# Patient Record
Sex: FEMALE | Race: WHITE
[De-identification: names, ages, dates, MRNs, and addresses within clinical notes are randomized per-mention and may not be internally consistent; named-entity substitution may affect disease eponyms.]

---

## 2020-10-31 ENCOUNTER — HOSPITAL ENCOUNTER (INPATIENT)
Dept: HOSPITAL 72 - EMR | Age: 70
LOS: 4 days | Discharge: HOME | DRG: 563 | End: 2020-11-04
Payer: MEDICARE

## 2020-10-31 VITALS — SYSTOLIC BLOOD PRESSURE: 132 MMHG | DIASTOLIC BLOOD PRESSURE: 71 MMHG

## 2020-10-31 VITALS
BODY MASS INDEX: 38.46 KG/M2 | WEIGHT: 209 LBS | DIASTOLIC BLOOD PRESSURE: 83 MMHG | SYSTOLIC BLOOD PRESSURE: 157 MMHG | HEIGHT: 62 IN

## 2020-10-31 VITALS — DIASTOLIC BLOOD PRESSURE: 70 MMHG | SYSTOLIC BLOOD PRESSURE: 130 MMHG

## 2020-10-31 VITALS — SYSTOLIC BLOOD PRESSURE: 135 MMHG | DIASTOLIC BLOOD PRESSURE: 77 MMHG

## 2020-10-31 VITALS — DIASTOLIC BLOOD PRESSURE: 79 MMHG | SYSTOLIC BLOOD PRESSURE: 151 MMHG

## 2020-10-31 VITALS — DIASTOLIC BLOOD PRESSURE: 67 MMHG | SYSTOLIC BLOOD PRESSURE: 141 MMHG

## 2020-10-31 DIAGNOSIS — Z88.6: ICD-10-CM

## 2020-10-31 DIAGNOSIS — N39.0: ICD-10-CM
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
## 2020-10-31 DIAGNOSIS — M85.89: ICD-10-CM

## 2020-10-31 DIAGNOSIS — R33.9: ICD-10-CM

## 2020-10-31 DIAGNOSIS — Z79.4: ICD-10-CM

## 2020-10-31 DIAGNOSIS — E66.9: ICD-10-CM
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
## 2020-10-31 DIAGNOSIS — N28.9: ICD-10-CM

## 2020-10-31 DIAGNOSIS — Z85.3: ICD-10-CM

## 2020-10-31 DIAGNOSIS — D63.8: ICD-10-CM

## 2020-10-31 DIAGNOSIS — S83.91XA: Primary | ICD-10-CM

## 2020-10-31 DIAGNOSIS — S93.401A: ICD-10-CM

## 2020-10-31 DIAGNOSIS — R26.2: ICD-10-CM

## 2020-10-31 DIAGNOSIS — E11.9: ICD-10-CM

## 2020-10-31 LAB
ADD MANUAL DIFF: NO
ALBUMIN SERPL-MCNC: 4 G/DL (ref 3.4–5)
ALBUMIN/GLOB SERPL: 1.1 {RATIO} (ref 1–2.7)
ALP SERPL-CCNC: 96 U/L (ref 46–116)
ALT SERPL-CCNC: 19 U/L (ref 12–78)
APPEARANCE UR: (no result)
APTT BLD: 26 SEC (ref 23–33)
APTT PPP: (no result) S
AST SERPL-CCNC: 20 U/L (ref 15–37)
BASOPHILS NFR BLD AUTO: 0.6 % (ref 0–2)
BILIRUB SERPL-MCNC: 0.4 MG/DL (ref 0.2–1)
BUN SERPL-MCNC: 30 MG/DL (ref 7–18)
CALCIUM SERPL-MCNC: 9.5 MG/DL (ref 8.5–10.1)
CHLORIDE SERPL-SCNC: 104 MMOL/L (ref 98–107)
CK SERPL-CCNC: 173 U/L (ref 26–308)
CO2 SERPL-SCNC: 25 MMOL/L (ref 21–32)
CREAT SERPL-MCNC: 1.4 MG/DL (ref 0.55–1.3)
EOSINOPHIL NFR BLD AUTO: 1.1 % (ref 0–3)
ERYTHROCYTE [DISTWIDTH] IN BLOOD BY AUTOMATED COUNT: 14.9 % (ref 11.6–14.8)
GLOBULIN SER-MCNC: 3.6 G/DL
GLUCOSE UR STRIP-MCNC: NEGATIVE MG/DL
HCT VFR BLD CALC: 40.3 % (ref 37–47)
HGB BLD-MCNC: 13.1 G/DL (ref 12–16)
INR PPP: 1 (ref 0.9–1.1)
KETONES UR QL STRIP: (no result)
LEUKOCYTE ESTERASE UR QL STRIP: (no result)
LYMPHOCYTES NFR BLD AUTO: 14.1 % (ref 20–45)
MCV RBC AUTO: 86 FL (ref 80–99)
MONOCYTES NFR BLD AUTO: 6.1 % (ref 1–10)
NEUTROPHILS NFR BLD AUTO: 78.2 % (ref 45–75)
NITRITE UR QL STRIP: NEGATIVE
PH UR STRIP: 5 [PH] (ref 4.5–8)
PLATELET # BLD: 253 K/UL (ref 150–450)
POTASSIUM SERPL-SCNC: 3.6 MMOL/L (ref 3.5–5.1)
PROT UR QL STRIP: (no result)
RBC # BLD AUTO: 4.72 M/UL (ref 4.2–5.4)
SODIUM SERPL-SCNC: 141 MMOL/L (ref 136–145)
SP GR UR STRIP: 1.02 (ref 1–1.03)
UROBILINOGEN UR-MCNC: NORMAL MG/DL (ref 0–1)
WBC # BLD AUTO: 8.8 K/UL (ref 4.8–10.8)

## 2020-10-31 PROCEDURE — 81003 URINALYSIS AUTO W/O SCOPE: CPT

## 2020-10-31 PROCEDURE — 80048 BASIC METABOLIC PNL TOTAL CA: CPT

## 2020-10-31 PROCEDURE — 99285 EMERGENCY DEPT VISIT HI MDM: CPT

## 2020-10-31 PROCEDURE — 83550 IRON BINDING TEST: CPT

## 2020-10-31 PROCEDURE — 84484 ASSAY OF TROPONIN QUANT: CPT

## 2020-10-31 PROCEDURE — 36415 COLL VENOUS BLD VENIPUNCTURE: CPT

## 2020-10-31 PROCEDURE — 85610 PROTHROMBIN TIME: CPT

## 2020-10-31 PROCEDURE — 83540 ASSAY OF IRON: CPT

## 2020-10-31 PROCEDURE — 71045 X-RAY EXAM CHEST 1 VIEW: CPT

## 2020-10-31 PROCEDURE — 85730 THROMBOPLASTIN TIME PARTIAL: CPT

## 2020-10-31 PROCEDURE — 93005 ELECTROCARDIOGRAM TRACING: CPT

## 2020-10-31 PROCEDURE — 82728 ASSAY OF FERRITIN: CPT

## 2020-10-31 PROCEDURE — 85651 RBC SED RATE NONAUTOMATED: CPT

## 2020-10-31 PROCEDURE — 82550 ASSAY OF CK (CPK): CPT

## 2020-10-31 PROCEDURE — 84550 ASSAY OF BLOOD/URIC ACID: CPT

## 2020-10-31 PROCEDURE — 87086 URINE CULTURE/COLONY COUNT: CPT

## 2020-10-31 PROCEDURE — 85025 COMPLETE CBC W/AUTO DIFF WBC: CPT

## 2020-10-31 PROCEDURE — 83880 ASSAY OF NATRIURETIC PEPTIDE: CPT

## 2020-10-31 PROCEDURE — 82962 GLUCOSE BLOOD TEST: CPT

## 2020-10-31 PROCEDURE — 80053 COMPREHEN METABOLIC PANEL: CPT

## 2020-10-31 PROCEDURE — 87040 BLOOD CULTURE FOR BACTERIA: CPT

## 2020-10-31 PROCEDURE — 86140 C-REACTIVE PROTEIN: CPT

## 2020-10-31 RX ADMIN — INSULIN ASPART SCH UNITS: 100 INJECTION, SOLUTION INTRAVENOUS; SUBCUTANEOUS at 16:30

## 2020-10-31 RX ADMIN — HEPARIN SODIUM SCH UNITS: 5000 INJECTION INTRAVENOUS; SUBCUTANEOUS at 20:11

## 2020-10-31 RX ADMIN — INSULIN ASPART SCH UNITS: 100 INJECTION, SOLUTION INTRAVENOUS; SUBCUTANEOUS at 20:23

## 2020-10-31 RX ADMIN — INSULIN DETEMIR SCH UNITS: 100 INJECTION, SOLUTION SUBCUTANEOUS at 20:23

## 2020-10-31 RX ADMIN — ATORVASTATIN CALCIUM SCH MG: 20 TABLET, FILM COATED ORAL at 20:09

## 2020-10-31 RX ADMIN — OXYCODONE HYDROCHLORIDE AND ACETAMINOPHEN PRN TAB: 5; 325 TABLET ORAL at 23:16

## 2020-10-31 NOTE — NUR
ED Nurse Note: pt BIBA  from home c/o right leg pain 8/10 onset today 
0400. Pt slipped and fell in the bathroom. Pt reports she had prior tib/fib 
fracture in 2017. Pt is able to moves toes, denies tinlging, numbness. Pt 
denies impact to head, denies loss of conciousness.

## 2020-10-31 NOTE — NUR
NURSE NOTES:

received pt and report from DRE Swenson. pt is alert and oriented x 4 with no s/s of acute 
distress. pt with co pain. will contact dr regarding different options for pain control bec 
pt has allergy to norco. IV site clean dry and intact and running fluids as ordered. plan of 
care discussed.

## 2020-10-31 NOTE — NUR
ED Nurse Note: Report received from DRE Brown. Pt lying comfortably in bed with 
no signs of distress.

## 2020-10-31 NOTE — NUR
NURSE NOTES:

patient with allergy to tylenol and hydrocodone. patient was given morphine but pt did not 
like the feeling after taking the morphine. pt tolerated the tramadol but was asking for 
higher dose. dilaudid prescribed but note from pharmacy not to give as pt has allergy to 
norco. Dr. Peña notified, still awaiting orders. Dr. Haque ordered to change interval of 
tramadol. Dr. Rivera ordered percocet but I notified him about the pts allergies. will 
continue to follow up on pain management for the patient.

## 2020-10-31 NOTE — NUR
NURSE NOTES:

clarified allergies with pt. pt states no allergy to tylenol, that she takes tylenol at 
home, but has allergy to claritin.

## 2020-10-31 NOTE — HISTORY AND PHYSICAL REPORT
DATE OF ADMISSION:  10/31/2020

TIME SEEN:  At 10 a.m.



CONSULTANTS:

1. Flo Rivera MD.

2. Brandon Limon MD.



CHIEF COMPLAINT:  Fall, right leg pain, weakness, failure to

thrive.



BRIEF HISTORY:  This is a 70-year-old female, who lives at home by herself.

Apparently, was in the restroom, she slipped and then fell on her right

leg, was weak, could not get up and was brought to [place], diagnosed with

the above, and being admitted to medical floor.  Currently, calm, in the

ER [facility], no complaint otherwise.



REVIEW OF SYSTEMS:  No chest pain.  No shortness of breath.  No nausea,

vomiting, or diarrhea.



PAST MEDICAL HISTORY:  Includes diabetes, breast cancer.



PAST SURGICAL HISTORY:  Hysterectomy and lumpectomy.



MEDICATIONS:  Include ibuprofen, tramadol, _____.



ALLERGIES:  Norco.



SOCIAL HISTORY:  No smoking.  No alcohol.  No intravenous drug

abuse.



FAMILY HISTORY:  Noncontributory.



PHYSICAL EXAMINATION:

GENERAL:  Calm, in the ER [place], oriented x3, slight distress secondary to

pain.

VITAL SIGNS:  Temperature is 97 degrees, pulse _____, respirations 20,

blood pressure 151/79.

CARDIOVASCULAR:  No murmur.

LUNGS:  Distant and clear.

ABDOMEN:  Bowel sounds positive.  Nontender, nondistended.

EXTREMITIES:  No cyanosis or edema.

NEUROLOGIC:  The patient moves all extremities, slightly weak.



LABORATORY DATA:  Labs at this time CBC is normal.  BMP shows BUN and

creatinine is 30/1.4.  INR is 1.0.  Urinalysis is 3+ protein, 2+ leukocyte

esterase.



ASSESSMENT:  Right leg pain, weak, status post fall, failure to thrive,

urinary retention, UTI, diabetes, breast cancer.



PLAN:

1. PT, OT, dietary followup.  Pain control.

2. We will add ID and follow Dr. Haywood, and antibiotics per Infectious

Disease.

3. We will continue blood pressure, blood sugar control.

4. We will continue to follow the patient.

5. CBC and BMP in the morning.









  ______________________________________________

  Dominguez Haque D.O.





DR:  CYNDI

D:  10/31/2020 10:15

T:  10/31/2020 23:02

JOB#:  [id number]

CC:

## 2020-10-31 NOTE — DIAGNOSTIC IMAGING REPORT
EXAM:

  XR Right Foot Complete, 3 or More Views

 

CLINICAL HISTORY:

  TRAUMA

 

TECHNIQUE:

  Frontal, lateral and oblique views of the right foot.

 

COMPARISON:

  None

 

FINDINGS:

  Bones/joints: No displaced fracture or dislocation identified.  

Osteopenia.  Degenerative changes of the right first MTP joint and 

interphalangeal joints.

 

  Soft tissues: Normal.

 

  Other: Vascular calcifications.

 

IMPRESSION:   

  No displaced fracture or dislocation identified.

## 2020-10-31 NOTE — NUR
-------------------------------------------------------------------------------

           *** Note undone in EDM - 10/31/20 at 0940 by HAROLDO ***            

-------------------------------------------------------------------------------

ED Nurse Note: Report given to DRE Swenson on 3E

## 2020-10-31 NOTE — NUR
NURSE NOTES:

Received pt from ER and report from DRE Zarate. Pt transferred by ER tech via gurney. 
Checked all belongings with pt. Pt alert and oriented, able to make needs known. RA, no 
respiratory distress noted. Pt c/o pain on right foot 6/10. Vital signs stable. Skin intact. 
No redness or swelling noted.  [place] pt to room. Educated pt to use for assistance. pt 
verbalized understanding. Side rails upx2. Bed in low position, locked. Call light and needs 
within reach. Will follow up with Dr. Haque for admission orders and pain.

## 2020-10-31 NOTE — NUR
NURSE NOTES:

Pt stated that morphine gave her sudden headache and weird feeling after morphine was give 
for pain. MD aware. Received new order. order carried out.

## 2020-10-31 NOTE — EMERGENCY ROOM REPORT
History of Present Illness


General


Chief Complaint:  Pain


Source:  Patient





Present Illness


HPI


The patient recently moved into an apartment.  She was trying to urinate 

hovering over the toilet and slipped.  She Laurent she twisted her ankle or fell

on her leg twisting it she is unable to walk because of pain.  She fractured her

tibia in 2019.  She was in rehab after that and healed completely.  She denies 

hitting her head or loss of consciousness.  There is no hip or back pain.  She 

denies numbness.  This happened just before calling 911 and being transported by

[facility].  She reports the pain 9/10 at this time, aching.  It is better when she is 

not trying to weight-bear or ambulate -which she cannot do.





The patient is a type II diabetic but this is taking insulin.  She denies 

polyuria or polydipsia.





No fevers, chills, sore throat, chest pain, palpitations, nausea, vomiting, 

diarrhea, dysuria, abdominal pain, shortness of breath, rashes, depression, 

anxiety, visual changes, dizziness, headache.


Allergies:  


Coded Allergies:  


     ACETAMINOPHEN (Verified  Allergy, Unknown, 10/31/20)


     HYDROCODONE (Verified  Allergy, Unknown, 10/31/20)





COVID-19 Screening


Contact w/high risk pt:  No


Experienced COVID-19 symptoms?:  No


COVID-19 Testing performed PTA:  No





Patient History


Past Medical History:  see triage record, DM


Past Surgical History:  hysterectomy, other - tibia fracture 2019, L breast 

lumpectomy


Social History:  Denies: smoking, alcohol use, drug use


Social History Narrative


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


Last Menstrual Period:  UNK


Pregnant Now:  No


Reviewed Nursing Documentation:  PMH: Agreed; PSxH: Agreed





Nursing Documentation-PMH


Past Medical History:  No History, Except For


Hx Diabetes:  Yes - DM II


Hx Cancer:  Yes - L SIDED BREAST CANCER LUMPECTOMY





Review of Systems


All Other Systems:  negative except mentioned in HPI





Physical Exam





Vital Signs








  Date Time  Temp Pulse Resp B/P (MAP) Pulse Ox O2 Delivery O2 Flow Rate FiO2


 


10/31/20 05:36 98.1 103 20 157/83 (107) 99 Room Air  








Sp02 EP Interpretation:  reviewed, normal


General Appearance:  well appearing, no apparent distress, GCS 15


Head:  normocephalic, atraumatic


Eyes:  bilateral eye normal inspection, bilateral eye PERRL, bilateral eye EOMI


ENT:  moist mucus membranes


Neck:  normal inspection, full range of motion, supple, no bony tend


Respiratory:  chest non-tender, lungs clear, normal breath sounds


Cardiovascular #1:  regular rate, rhythm


Cardiovascular #2:  2+ radial (R), 2+ dorsalis pedis (R)


Gastrointestinal:  normal inspection, normal bowel sounds, non tender, soft


Genitourinary:  no CVA tenderness


Musculoskeletal:  normal range of motion, no calf tenderness, tenderness - Ankle

area no point or crepitance


Neurologic:  alert, distal neuro normal, grossly normal


Psychiatric:  judgement/insight normal, mood/affect normal


Skin:  normal color, no rash, warm/dry





Medical Decision Making


Diagnostic Impression:  


   Primary Impression:  


   Fall


   Qualified Codes:  W19.XXXA - Unspecified fall, initial encounter


   Additional Impressions:  


   Unable to ambulate


   Sprain of right lower leg


   Qualified Codes:  S83.91XA - Sprain of unspecified site of right knee, 

   initial encounter


   Diabetes


   Qualified Codes:  E11.9 - Type 2 diabetes mellitus without complications; 

   Z79.4 - Long term (current) use of insulin


   Renal insufficiency


ER Course


Patient presents after fall with right lower leg pain.  Differential includes 

fracture, sprain or contusion.  There is no evidence of DVT at this time.  There

was no loss of consciousness.  X-rays are indicated and the patient states she 

can tolerate tramadol.  No laboratory is indicated at this time.  An Accu-Chek 

will be obtained.





X-rays show osteopenia and old injury.  Accucheck 89.





Patient unable to ambulate with crutches.  Pain is uncontrolled.  Admit.  Social

services consult ordered.





EKG sinus rhythm incomplete right bundle branch block left anterior fascicular 

block LVH.  CBC normal.  CMP with renal insufficiency.  UA clear.





Discussed with Dr. Haque.





Laboratory Tests








Test


 10/31/20


06:13 10/31/20


08:55 10/31/20


09:35 10/31/20


16:55


 


POC Whole Blood Glucose


 89 MG/DL


() 


 


 Pending  





 


White Blood Count


 


 8.8 K/UL


(4.8-10.8) 


 





 


Red Blood Count


 


 4.72 M/UL


(4.20-5.40) 


 





 


Hemoglobin


 


 13.1 G/DL


(12.0-16.0) 


 





 


Hematocrit


 


 40.3 %


(37.0-47.0) 


 





 


Mean Corpuscular Volume  86 FL (80-99)    


 


Mean Corpuscular Hemoglobin


 


 27.7 PG


(27.0-31.0) 


 





 


Mean Corpuscular Hemoglobin


Concent 


 32.4 G/DL


(32.0-36.0) 


 





 


Red Cell Distribution Width


 


 14.9 %


(11.6-14.8)  H 


 





 


Platelet Count


 


 253 K/UL


(150-450) 


 





 


Mean Platelet Volume


 


 7.1 FL


(6.5-10.1) 


 





 


Neutrophils (%) (Auto)


 


 78.2 %


(45.0-75.0)  H 


 





 


Lymphocytes (%) (Auto)


 


 14.1 %


(20.0-45.0)  L 


 





 


Monocytes (%) (Auto)


 


 6.1 %


(1.0-10.0) 


 





 


Eosinophils (%) (Auto)


 


 1.1 %


(0.0-3.0) 


 





 


Basophils (%) (Auto)


 


 0.6 %


(0.0-2.0) 


 





 


Prothrombin Time


 


 11.0 SEC


(9.30-11.50) 


 





 


Prothrombin Time INR  1.0 (0.9-1.1)    


 


Activated Partial


Thromboplast Time 


 26 SEC (23-33)


 


 





 


Sodium Level


 


 141 MMOL/L


(136-145) 


 





 


Potassium Level


 


 3.6 MMOL/L


(3.5-5.1) 


 





 


Chloride Level


 


 104 MMOL/L


() 


 





 


Carbon Dioxide Level


 


 25 MMOL/L


(21-32) 


 





 


Blood Urea Nitrogen


 


 30 mg/dL


(7-18)  H 


 





 


Creatinine


 


 1.4 MG/DL


(0.55-1.30)  H 


 





 


Estimated Glomerular


Filtration Rate 


 37.2 mL/min


(>60) 


 





 


Glucose Level


 


 97 MG/DL


() 


 





 


Uric Acid


 


 5.1 MG/DL


(2.6-7.2) 


 





 


Calcium Level


 


 9.5 MG/DL


(8.5-10.1) 


 





 


Total Bilirubin


 


 0.4 MG/DL


(0.2-1.0) 


 





 


Aspartate Amino Transferase


(AST) 


 20 U/L (15-37)


 


 





 


Alanine Aminotransferase (ALT)


 


 19 U/L (12-78)


 


 





 


Alkaline Phosphatase


 


 96 U/L


() 


 





 


Total Creatine Kinase


 


 173 U/L


() 


 





 


Troponin I


 


 0.000 ng/mL


(0.000-0.056) 


 





 


C-Reactive Protein,


Quantitative 


 0.8 mg/dL


(0.00-0.90) 


 





 


Pro-B-Type Natriuretic Peptide


 


 99 pg/mL


(0-125) 


 





 


Total Protein


 


 7.6 G/DL


(6.4-8.2) 


 





 


Albumin


 


 4.0 G/DL


(3.4-5.0) 


 





 


Globulin  3.6 g/dL    


 


Albumin/Globulin Ratio  1.1 (1.0-2.7)    


 


Urine Color   Pale yellow   


 


Urine Appearance


 


 


 Slightly


cloudy 





 


Urine pH   5 (4.5-8.0)   


 


Urine Specific Gravity


 


 


 1.025


(1.005-1.035) 





 


Urine Protein


 


 


 3+ (NEGATIVE)


H 





 


Urine Glucose (UA)


 


 


 Negative


(NEGATIVE) 





 


Urine Ketones


 


 


 1+ (NEGATIVE)


H 





 


Urine Blood


 


 


 1+ (NEGATIVE)


H 





 


Urine Nitrite


 


 


 Negative


(NEGATIVE) 





 


Urine Bilirubin


 


 


 Negative


(NEGATIVE) 





 


Urine Urobilinogen


 


 


 Normal MG/DL


(0.0-1.0) 





 


Urine Leukocyte Esterase


 


 


 2+ (NEGATIVE)


H 





 


Urine RBC


 


 


 0-2 /HPF (0 -


2) 





 


Urine WBC


 


 


 2-4 /HPF (0 -


2) 





 


Urine Squamous Epithelial


Cells 


 


 Moderate /LPF


(NONE/OCC)  H 





 


Urine Bacteria


 


 


 Moderate /HPF


(NONE)  H 





 


Test


 10/31/20


20:17 


 


 





 


POC Whole Blood Glucose


 102 MG/DL


() 


 


 











EKG Diagnostic Results


Rate:  normal


Rhythm:  NSR


ST Segments:  no acute changes - Incomplete right bundle branch block left 

anterior fascicular block left ventricular hypertrophy





Rhythm Strip Diag. Results


EP Interpretation:  yes


Rhythm:  NSR, no PVC's, no ectopy





Other X-Ray Diagnostic Results


Other X-Ray Diagnostic Results #1:  


   X-Ray ordered:  Right ankle


   # of Views/Limited Vs Complete:  3 View


   Indication:  Pain


   EP Interpretation:  Yes


   Interpretation:  no dislocation, no soft tissue swelling, no fractures, other

- Osteopenia and old injury


   Impression:  Other


   Electronically Signed by:  Electronically signed by Compa Mohamud MD


Other X-Ray Diagnostic Results #2:  


   X-Ray ordered:  Right tib-fib


   # of Views/Limited Vs Complete:  2 View


   Indication:  Pain


   EP Interpretation:  Yes


   Interpretation:  no dislocation, no soft tissue swelling, no fractures


   Impression:  Other


   Electronically Signed by:  Electronically signed by Compa Mohamud MD


Other X-Ray Diagnostic Results #3:  


   X-Ray ordered:  Right foot


   # of Views/Limited Vs Complete:  3 View


   Indication:  Pain


   EP Interpretation:  Yes


   Interpretation:  no dislocation, no soft tissue swelling, no fractures


   Impression:  Other


   Electronically Signed by:  Electronically signed by Compa Mohamud MD





Last Vital Signs








  Date Time  Temp Pulse Resp B/P (MAP) Pulse Ox O2 Delivery O2 Flow Rate FiO2


 


10/31/20 21:00      Room Air  


 


10/31/20 20:00 98.2 92 18 141/67 (91) 99   








Status:  improved


Disposition:  ADMITTED AS INPATIENT


Condition:  Serious











Compa Mohamud MD                Oct 31, 2020 06:12

## 2020-10-31 NOTE — DIAGNOSTIC IMAGING REPORT
EXAM:

  XR Right Ankle Complete, 3 Views

 

CLINICAL HISTORY:

  TRAUMA

 

TECHNIQUE:

  Frontal, lateral and oblique views of the right ankle.

 

COMPARISON:

  No relevant prior studies available.

 

FINDINGS:

 

Generalized osteopenia.  Alignment of bony structures is within normal 

limits.  No radiographic evidence of acute fracture or dislocation.

 

IMPRESSION:     

 

No acute fracture or dislocation.

 

Significant generalized osteopenia.

## 2020-10-31 NOTE — NUR
NURSE NOTES:

Dr. Haque with orders to change frequency of tramadol from BIDprn to TIDprn for moderate 
pain. Received orders from Dr. Rivera for Percocet 5mg for pain. will input orders.

## 2020-10-31 NOTE — CONSULTATION
History of Present Illness


General


Date patient seen:  Oct 31, 2020


Chief Complaint:  Pain


Referring physician:  [place]


Reason for Consultation:  UTI





Present Illness


HPI


Ms. Morales is a 69 yo female with PHMx of DM and Breast CA ( Status post 

resection) who presented to the ED after a fall. She reports that she feel while

trying to urinate. She felt that she twisted her ankle. She does have a history 

of fracturing her right ankle last year. She denies dysuria, hematuria and 

polyuria. Her UA showed increased LE and some bacteria was seen. She denies, 

N/V/D abd, cough and SOB. 





ID was consulted for UTI





PMHx


DM


Breast CA ( Status post resection)


Right Ankle Fx  2019





SocHx


Denies E/T/D





FamHx


Not contributory


Allergies:  


Coded Allergies:  


     No Known Allergies (Unverified , 10/31/20)





Medication History


Scheduled


Cholecalciferol (Vitamin D3) (Vitamin D3*), 25 MCG PO DAILY, (Reported)


Folic Acid* (Folic Acid*), 1 MG ORAL DAILY, (Reported)


Insulin Glargine (Lantus), 38 SUBQ BEDTIME, (Reported)


Pioglitazone Hcl* (Actos*), 45 MG ORAL DAILY, (Reported)


Rosuvastatin Calcium* (Crestor*), 10 MG ORAL DAILY, (Reported)


Semaglutide (Rybelsus), 0.25 MG PO ONCE A WEEK, (Reported)





Patient History


Healthcare decision maker





Resuscitation status





Advanced Directive on File








Review of Systems


ROS Narrative





ROS Negative except as noted  in the HPI





Physical Exam





Last 24 Hour Vital Signs








  Date Time  Temp Pulse Resp B/P (MAP) Pulse Ox O2 Delivery O2 Flow Rate FiO2


 


10/31/20 07:45 97.8 81 20 151/79 98 Room Air  


 


10/31/20 06:41 98.1       


 


10/31/20 05:43 98.1 77 20 157/83 99 Room Air  


 


10/31/20 05:36 98.1 103 20 157/83 (107) 99 Room Air  

















Intake and Output  


 


 10/30/20 10/31/20





 19:00 07:00


 


Intake Total  100 ml


 


Balance  100 ml


 


  


 


Intake Oral  100 ml











Laboratory Tests








Test


 10/31/20


06:13 10/31/20


08:55 10/31/20


09:35


 


POC Whole Blood Glucose


 89 MG/DL


() 


 





 


White Blood Count


 


 8.8 K/UL


(4.8-10.8) 





 


Red Blood Count


 


 4.72 M/UL


(4.20-5.40) 





 


Hemoglobin


 


 13.1 G/DL


(12.0-16.0) 





 


Hematocrit


 


 40.3 %


(37.0-47.0) 





 


Mean Corpuscular Volume  86 FL (80-99)   


 


Mean Corpuscular Hemoglobin


 


 27.7 PG


(27.0-31.0) 





 


Mean Corpuscular Hemoglobin


Concent 


 32.4 G/DL


(32.0-36.0) 





 


Red Cell Distribution Width


 


 14.9 %


(11.6-14.8)  H 





 


Platelet Count


 


 253 K/UL


(150-450) 





 


Mean Platelet Volume


 


 7.1 FL


(6.5-10.1) 





 


Neutrophils (%) (Auto)


 


 78.2 %


(45.0-75.0)  H 





 


Lymphocytes (%) (Auto)


 


 14.1 %


(20.0-45.0)  L 





 


Monocytes (%) (Auto)


 


 6.1 %


(1.0-10.0) 





 


Eosinophils (%) (Auto)


 


 1.1 %


(0.0-3.0) 





 


Basophils (%) (Auto)


 


 0.6 %


(0.0-2.0) 





 


Prothrombin Time


 


 11.0 SEC


(9.30-11.50) 





 


Prothromb Time International


Ratio 


 1.0 (0.9-1.1)  


 





 


Activated Partial


Thromboplast Time 


 26 SEC (23-33)


 





 


Sodium Level


 


 141 MMOL/L


(136-145) 





 


Potassium Level


 


 3.6 MMOL/L


(3.5-5.1) 





 


Chloride Level


 


 104 MMOL/L


() 





 


Carbon Dioxide Level


 


 25 MMOL/L


(21-32) 





 


Blood Urea Nitrogen


 


 30 mg/dL


(7-18)  H 





 


Creatinine


 


 1.4 MG/DL


(0.55-1.30)  H 





 


Estimat Glomerular Filtration


Rate 


 37.2 mL/min


(>60) 





 


Glucose Level


 


 97 MG/DL


() 





 


Uric Acid


 


 5.1 MG/DL


(2.6-7.2) 





 


Calcium Level


 


 9.5 MG/DL


(8.5-10.1) 





 


Total Bilirubin


 


 0.4 MG/DL


(0.2-1.0) 





 


Aspartate Amino Transf


(AST/SGOT) 


 20 U/L (15-37)


 





 


Alanine Aminotransferase


(ALT/SGPT) 


 19 U/L (12-78)


 





 


Alkaline Phosphatase


 


 96 U/L


() 





 


Total Creatine Kinase


 


 173 U/L


() 





 


Troponin I


 


 0.000 ng/mL


(0.000-0.056) 





 


C-Reactive Protein,


Quantitative 


 0.8 mg/dL


(0.00-0.90) 





 


Pro-B-Type Natriuretic Peptide


 


 99 pg/mL


(0-125) 





 


Total Protein


 


 7.6 G/DL


(6.4-8.2) 





 


Albumin


 


 4.0 G/DL


(3.4-5.0) 





 


Globulin  3.6 g/dL   


 


Albumin/Globulin Ratio  1.1 (1.0-2.7)   


 


Urine Color   Pale yellow  


 


Urine Appearance


 


 


 Slightly


cloudy


 


Urine pH   5 (4.5-8.0)  


 


Urine Specific Gravity


 


 


 1.025


(1.005-1.035)


 


Urine Protein


 


 


 3+ (NEGATIVE)


H


 


Urine Glucose (UA)


 


 


 Negative


(NEGATIVE)


 


Urine Ketones


 


 


 1+ (NEGATIVE)


H


 


Urine Blood


 


 


 1+ (NEGATIVE)


H


 


Urine Nitrite


 


 


 Negative


(NEGATIVE)


 


Urine Bilirubin


 


 


 Negative


(NEGATIVE)


 


Urine Urobilinogen


 


 


 Normal MG/DL


(0.0-1.0)


 


Urine Leukocyte Esterase


 


 


 2+ (NEGATIVE)


H


 


Urine RBC


 


 


 0-2 /HPF (0 -


2)


 


Urine WBC


 


 


 2-4 /HPF (0 -


2)


 


Urine Squamous Epithelial


Cells 


 


 Moderate /LPF


(NONE/OCC)  H


 


Urine Bacteria


 


 


 Moderate /HPF


(NONE)  H








Height (Feet):  5


Height (Inches):  2.00


Weight (Pounds):  210


Objective Narrative


GEN: NAD


HEENT: NCAT, MMM, EOMI, No Scleral icterus, 


Neck: No LAD, Supple


LUNGS: CTAB, No W


HEART: RRR, S1, S2. 


ABD: Soft, NT, Mild Distension


Skin: No rash,, Normal in color


Ext. Some pain with on ambulation right LE


NEURO: A/O x 4, No focal deficits





Assessment/Plan


Assessment/Plan:


69 yo female with PHMx of DM and Breast CA ( Status post resection) who 

presented to the ED after a fall. 





Bacteriuria


    UA  Increased LE and Mod Ryan seen


    Asymptomatic





Fall 


Afebrile


No Leukocytosis


 


DM


Breast CA ( Status post resection)


Right Ankle Fx  2019





PLAN


- Monitor off abx


- f/u Cultures


- Monitor CBC and Temps





Thank you for this consult. [facility] will continue to follow Ms. Morales while 

she hiospitalized.











Compa Fraser MD            Oct 31, 2020 11:15

## 2020-10-31 NOTE — NUR
NURSE HAND-OFF: 



Important Events on Shift:[Admission, C/o severe right lower leg pain, Pain not controlled.]

Patient Status: [severe pain]

Diet: [CCHO]



Pending Orders: []

Pending Results/Labs:[]

Pending MD notification:[]



Latest Vital Signs: Temperature 98.2 , Pulse 79 , B/P 135 /77 , Respiratory Rate 18 , O2 SAT 
99 , Room Air, O2 Flow Rate  .  

Vital Sign Comment: [stable]



Latest Krishnamurthy Fall Score: 60  

Fall Risk: High Risk 

Safety Measures: Call light Within Reach, Bed Alarm Zone 1, Side Rails Side Rails x2, Bed 
position Low and Locked.

Fall Precautions: 



Report given to [DRE Honeycutt].

## 2020-10-31 NOTE — NUR
NURSE NOTES:

blood sugar reading HS was 102. pt hasnt been eating much all day and she said 102 if a low 
blood sugar reading for her during this time of night. pt refused the levemir dose of 38 
units. sliding scale dose not indicated per protocol.

## 2020-10-31 NOTE — DIAGNOSTIC IMAGING REPORT
EXAM:

  XR Right Tibia and Fibula, 2 Views

 

CLINICAL HISTORY:

  TRAUMA

 

TECHNIQUE:

  Frontal and lateral views of the right tibia and fibula.

 

COMPARISON:

  No relevant prior studies available.

 

FINDINGS:

 

Severe generalized osteopenia.  Alignment of bony structures is within 

normal limits.  No definite radiographic evidence of acute fracture or 

dislocation.

 

IMPRESSION:     

 

No radiographic evidence of acute fracture or dislocation.

 

Severe generalized osteopenia.

## 2020-11-01 VITALS — SYSTOLIC BLOOD PRESSURE: 118 MMHG | DIASTOLIC BLOOD PRESSURE: 51 MMHG

## 2020-11-01 VITALS — SYSTOLIC BLOOD PRESSURE: 132 MMHG | DIASTOLIC BLOOD PRESSURE: 68 MMHG

## 2020-11-01 VITALS — DIASTOLIC BLOOD PRESSURE: 71 MMHG | SYSTOLIC BLOOD PRESSURE: 153 MMHG

## 2020-11-01 VITALS — DIASTOLIC BLOOD PRESSURE: 56 MMHG | SYSTOLIC BLOOD PRESSURE: 132 MMHG

## 2020-11-01 VITALS — DIASTOLIC BLOOD PRESSURE: 72 MMHG | SYSTOLIC BLOOD PRESSURE: 156 MMHG

## 2020-11-01 VITALS — SYSTOLIC BLOOD PRESSURE: 124 MMHG | DIASTOLIC BLOOD PRESSURE: 69 MMHG

## 2020-11-01 LAB
ADD MANUAL DIFF: NO
ALBUMIN SERPL-MCNC: 3 G/DL (ref 3.4–5)
ALBUMIN/GLOB SERPL: 0.8 {RATIO} (ref 1–2.7)
ALP SERPL-CCNC: 76 U/L (ref 46–116)
ALT SERPL-CCNC: 17 U/L (ref 12–78)
ANION GAP SERPL CALC-SCNC: 8 MMOL/L (ref 5–15)
AST SERPL-CCNC: 18 U/L (ref 15–37)
BASOPHILS NFR BLD AUTO: 0.5 % (ref 0–2)
BILIRUB SERPL-MCNC: 0.4 MG/DL (ref 0.2–1)
BUN SERPL-MCNC: 28 MG/DL (ref 7–18)
CALCIUM SERPL-MCNC: 8.8 MG/DL (ref 8.5–10.1)
CHLORIDE SERPL-SCNC: 107 MMOL/L (ref 98–107)
CO2 SERPL-SCNC: 27 MMOL/L (ref 21–32)
CREAT SERPL-MCNC: 1.2 MG/DL (ref 0.55–1.3)
EOSINOPHIL NFR BLD AUTO: 7.6 % (ref 0–3)
ERYTHROCYTE [DISTWIDTH] IN BLOOD BY AUTOMATED COUNT: 14.9 % (ref 11.6–14.8)
GLOBULIN SER-MCNC: 3.7 G/DL
HCT VFR BLD CALC: 35.9 % (ref 37–47)
HGB BLD-MCNC: 11.6 G/DL (ref 12–16)
LYMPHOCYTES NFR BLD AUTO: 31.8 % (ref 20–45)
MCV RBC AUTO: 88 FL (ref 80–99)
MONOCYTES NFR BLD AUTO: 9.1 % (ref 1–10)
NEUTROPHILS NFR BLD AUTO: 51.1 % (ref 45–75)
PLATELET # BLD: 222 K/UL (ref 150–450)
POTASSIUM SERPL-SCNC: 4.3 MMOL/L (ref 3.5–5.1)
RBC # BLD AUTO: 4.09 M/UL (ref 4.2–5.4)
SODIUM SERPL-SCNC: 142 MMOL/L (ref 136–145)
WBC # BLD AUTO: 5.4 K/UL (ref 4.8–10.8)

## 2020-11-01 RX ADMIN — HEPARIN SODIUM SCH UNITS: 5000 INJECTION INTRAVENOUS; SUBCUTANEOUS at 20:48

## 2020-11-01 RX ADMIN — INSULIN ASPART SCH UNITS: 100 INJECTION, SOLUTION INTRAVENOUS; SUBCUTANEOUS at 12:07

## 2020-11-01 RX ADMIN — ATORVASTATIN CALCIUM SCH MG: 20 TABLET, FILM COATED ORAL at 20:47

## 2020-11-01 RX ADMIN — ANALGESIC BALM SCH APPLIC: 1.74; 4.06 OINTMENT TOPICAL at 10:03

## 2020-11-01 RX ADMIN — ANALGESIC BALM SCH APPLIC: 1.74; 4.06 OINTMENT TOPICAL at 14:00

## 2020-11-01 RX ADMIN — VITAMIN D, TAB 1000IU (100/BT) SCH INTLU: 25 TAB at 10:03

## 2020-11-01 RX ADMIN — OXYCODONE HYDROCHLORIDE AND ACETAMINOPHEN PRN TAB: 5; 325 TABLET ORAL at 18:36

## 2020-11-01 RX ADMIN — ANALGESIC BALM SCH APPLIC: 1.74; 4.06 OINTMENT TOPICAL at 18:34

## 2020-11-01 RX ADMIN — ANALGESIC BALM SCH APPLIC: 1.74; 4.06 OINTMENT TOPICAL at 20:48

## 2020-11-01 RX ADMIN — INSULIN ASPART SCH UNITS: 100 INJECTION, SOLUTION INTRAVENOUS; SUBCUTANEOUS at 16:30

## 2020-11-01 RX ADMIN — INSULIN DETEMIR SCH UNITS: 100 INJECTION, SOLUTION SUBCUTANEOUS at 20:48

## 2020-11-01 RX ADMIN — HEPARIN SODIUM SCH UNITS: 5000 INJECTION INTRAVENOUS; SUBCUTANEOUS at 10:05

## 2020-11-01 RX ADMIN — OXYCODONE HYDROCHLORIDE AND ACETAMINOPHEN PRN TAB: 5; 325 TABLET ORAL at 04:18

## 2020-11-01 RX ADMIN — INSULIN ASPART SCH UNITS: 100 INJECTION, SOLUTION INTRAVENOUS; SUBCUTANEOUS at 06:16

## 2020-11-01 RX ADMIN — INSULIN ASPART SCH UNITS: 100 INJECTION, SOLUTION INTRAVENOUS; SUBCUTANEOUS at 20:48

## 2020-11-01 NOTE — NUR
NURSE NOTES:

pt with no acute s/s of distress. pt alert and oriented, pain level has decreased since 
administering the tramadol. pt no longer crying and is conversational. will continue to 
monitor pain level.

## 2020-11-01 NOTE — NUR
NURSE NOTES:

pt complaining of pain, gave percocet  but without the fall back, time should have been 0518 
already. the scheduled q6hrs was already surpassed at this time. percocet given to pt early 
administration according to [employer] eMAR. medication last given 10/31/20 at 2316. next dose 
due 11/1/20 at 0516. Due to fall back and the 1 hour turn back of the time, the amount of 
time elapsed in [employer] is 1 hour less than the true amount of time that has already 
elapsed.

## 2020-11-01 NOTE — NUR
NURSE NOTES:

Patient received in bed, on o2 via NC, no acute cardiorespiratory distress, oxygen 
saturation at 94%; verbalized consistent pain on right foot. Right leg elevated on pillow 
with lidocaine patch. Patient instructed to use incentive spirometer while awake, verbalized 
understanding. Call light within reach. Will continue to monitor.

## 2020-11-01 NOTE — GENERAL PROGRESS NOTE
Subjective


Allergies:  


Coded Allergies:  


     HYDROCODONE (Verified  Allergy, Unknown, 10/31/20)


     LORATADINE (Verified  Allergy, Unknown, 10/31/20)


   pt states not feeling well and having rashes after taking


   this medication at a rehab facility


All Systems:  reviewed and negative except above


Subjective


sl general pain





Objective





Last 24 Hour Vital Signs








  Date Time  Temp Pulse Resp B/P (MAP) Pulse Ox O2 Delivery O2 Flow Rate FiO2


 


11/1/20 08:00 98.6 87 16 132/68 (89) 96   


 


11/1/20 04:00 98.2 85 17 132/56 (81) 93   


 


11/1/20 00:00 98.2 88 19 118/51 (73) 94   


 


10/31/20 21:00      Room Air  


 


10/31/20 20:00 98.2 92 18 141/67 (91) 99   


 


10/31/20 16:00 98.2 79 18 135/77 (96) 99   


 


10/31/20 15:46 97.8       


 


10/31/20 13:08 97.8       


 


10/31/20 12:12      Room Air  


 


10/31/20 12:00 97.9 81 18 132/71 (91) 98   


 


10/31/20 10:15 98.3 79 18 142/74 98 Room Air  


 


10/31/20 10:15 97.8 85 18 130/70 (90) 99   

















Intake and Output  


 


 10/31/20 11/1/20





 19:00 07:00


 


Intake Total  900 ml


 


Output Total  225 ml


 


Balance  675 ml


 


  


 


Intake Oral  300 ml


 


IV Total  600 ml


 


Output Urine Total  225 ml


 


# Voids 1 








Laboratory Tests


10/31/20 09:35: 


Urine Color Pale yellow, Urine Appearance Slightly cloudy, Urine pH 5, Urine 

Specific Gravity 1.025, Urine Protein 3+H, Urine Glucose (UA) Negative, Urine K

etones 1+H, Urine Blood 1+H, Urine Nitrite Negative, Urine Bilirubin Negative, 

Urine Urobilinogen Normal, Urine Leukocyte Esterase 2+H, Urine RBC 0-2, Urine 

WBC 2-4, Urine Squamous Epithelial Cells ModerateH, Urine Bacteria ModerateH


10/31/20 16:55: POC Whole Blood Glucose [Pending]


10/31/20 20:17: POC Whole Blood Glucose 102


11/1/20 04:35: 


White Blood Count 5.4, Red Blood Count 4.09L, Hemoglobin 11.6L, Hematocrit 35.9L

, Mean Corpuscular Volume 88, Mean Corpuscular Hemoglobin 28.3, Mean Corpuscular

 Hemoglobin Concent 32.3, Red Cell Distribution Width 14.9H, Platelet Count 222,

 Mean Platelet Volume 6.7, Neutrophils (%) (Auto) 51.1, Lymphocytes (%) (Auto) 

31.8, Monocytes (%) (Auto) 9.1, Eosinophils (%) (Auto) 7.6H, Basophils (%) 

(Auto) 0.5, Sodium Level 142, Potassium Level 4.3, Chloride Level 107, Carbon 

Dioxide Level 27, Anion Gap 8, Blood Urea Nitrogen 28H, Creatinine 1.2, Estimat 

Glomerular Filtration Rate 44.5, Glucose Level 123H, Calcium Level 8.8, Total 

Bilirubin 0.4, Aspartate Amino Transf (AST/SGOT) 18, Alanine Aminotransferase 

(ALT/SGPT) 17, Alkaline Phosphatase 76, Total Protein 6.7, Albumin 3.0L, 

Globulin 3.7, Albumin/Globulin Ratio 0.8L


11/1/20 06:10: POC Whole Blood Glucose 99


Height (Feet):  5


Height (Inches):  2.00


Weight (Pounds):  209


General Appearance:  lethargic


EENT:  normal ENT inspection


Neck:  normal alignment


Cardiovascular:  normal peripheral pulses, normal rate, regular rhythm


Respiratory/Chest:  chest wall non-tender, lungs clear, normal breath sounds


Abdomen:  normal bowel sounds, non tender, soft


Extremities:  normal inspection


Edema:  no edema noted Arm (L), no edema noted Arm (R), no edema noted Leg (L), 

no edema noted Leg (R), no edema noted Pedal (L), no edema noted Pedal (R), no 

edema noted Generalized


Neurologic:  motor weakness


Skin:  normal pigmentation, warm/dry





Assessment/Plan


Problem List:  


(1) Diabetes


ICD Codes:  E11.9 - Type 2 diabetes mellitus without complications


SNOMED:  47253036, 216087654, [id number]


Qualifiers:  


   Qualified Codes:  E11.9 - Type 2 diabetes mellitus without complications; 

Z79.4 - Long term (current) use of insulin


(2) Renal insufficiency


ICD Codes:  N28.9 - Disorder of kidney and ureter, unspecified


SNOMED:  156702558, 613322447


(3) Fall


ICD Codes:  W19.XXXA - Unspecified fall, initial encounter


SNOMED:  4759830, 182162186, [id number]


Qualifiers:  


   Qualified Codes:  W19.XXXA - Unspecified fall, initial encounter


(4) Unable to ambulate


ICD Codes:  R26.2 - Difficulty in walking, not elsewhere classified


SNOMED:  807671032, 628323489, [id number]


(5) Sprain of right lower leg


ICD Codes:  S83.91XA - Sprain of unspecified site of right knee, initial 

encounter


SNOMED:  668627659, 079914116, [id number]


Qualifiers:  


   Qualified Codes:  S83.91XA - Sprain of unspecified site of right knee, 

initial encounter


Status:  unchanged


Assessment/Plan:


pt diet pain control abx cbc bmp am aru Dominguez Gerard DO         Nov 1, 2020 09:10

## 2020-11-01 NOTE — NUR
NURSE NOTES:

pt turned and absorbant pads changed and glider inserted underneath pt. pt in pain from 
moving her around. given tramadol prn for her pain. will reassess and follow up.

## 2020-11-01 NOTE — NUR
NURSE NOTES:

Received report from Yinka ERICKSON. Patient is awake and oriented, in no apparent distress but 
reporting pain in right foot/ankle. Right ankle wrapped and elevated, ice pack applied. IV 
running IVF per order. Fall precautions maintained. Side rails upx3, bed low and locked, 
call light within reach.

## 2020-11-01 NOTE — NUR
NURSE HAND-OFF: 



Important Events on Shift: PT, COVID swab negative, patient placed on O2.

Patient Status: stable 

Diet: CCHO



Pending Orders: blood cultures x2

Pending Results/Labs: blood cultures

Pending MD notification: N/A



Latest Vital Signs: Temperature 99.3 , Pulse 103 , B/P 153 /71 , Respiratory Rate 18 , O2 
SAT 95 , Room Air, O2 Flow Rate 2.0 .  

Vital Sign Comment: VS stable



Latest Krishnamurthy Fall Score: 80  

Fall Risk: High Risk 

Safety Measures: Call light Within Reach, Bed Alarm Zone 1, Side Rails Side Rails x3, Bed 
position Low and Locked.

Fall Precautions: 

Yellow Socks

Door Sign

Patient Fall Education



Report given to Marcy ERICKSON.

## 2020-11-01 NOTE — NUR
PT Note

PT marlen completed, treatment initiated. Patient c/o severe pain on her right ankle/foot and 
tends to anticipate the pain, crying loudly with attempts to move her RLE. She requires 
assist in bed mobility; unable to stand and ambulate. Patient needs physical therapy to 
increase her muscle strength and decrease right LE pain to improve her functional mobility 
and gait.

-------------------------------------------------------------------------------

Addendum: 11/01/20 at 1614 by URIEL ODEN PT

-------------------------------------------------------------------------------

Amended: Links added.

## 2020-11-01 NOTE — NUR
NURSE NOTES:

Received callback from Dr. Fraser at 1734. New orders for COVID swab, blood cultures, O2, 
Tylenol, and IS received from MD. Orders read back and entered. Patient swabbed for COVID 
and swab transported to lab. Patient provided with IS and instructed in use. Patient 
saturating 94-95% on 2L NC. Patient denies SOB/respiratory distress, patient is on 
continuous pulseox monitoring at this time.

## 2020-11-01 NOTE — NUR
NURSE HAND-OFF: 



Important Events on Shift: pain management

Patient Status: stable

Diet: consistent car



Pending Orders: NA

Pending Results/Labs:NA

Pending MD notification: pain control, severity for PRN percocet



Latest Vital Signs: Temperature 98.2 , Pulse 85 , B/P 132 /56 , Respiratory Rate 17 , O2 SAT 
93 , Room Air, O2 Flow Rate  .  

Vital Sign Comment: stable through the shift



Latest Krishnamurthy Fall Score: 60  

Fall Risk: High Risk 

Safety Measures: Call light Within Reach, Bed Alarm Zone 1, Side Rails Side Rails x2, Bed 
position Low and Locked.

Fall Precautions: 

Yellow Socks

Patient Fall Education



Report given to DRE Obrien.

## 2020-11-01 NOTE — NUR
NURSE NOTES:

Patient refused levemir as ordered; educated re: indications risks and benefits of the 
medication. Patient verbalized understanding and stated that her blood sugar normally goes 
down during the night even without levemir. Will continue to monitor.

## 2020-11-01 NOTE — CONSULTATION
DATE OF CONSULTATION:  11/01/2020

PAIN MANAGEMENT CONSULTATION



CONSULTING PHYSICIAN:  Behnoush Zarrini, MD.



REFERRING PHYSICIAN:  Dominguez Haque DO.



PHYSICIAN ASSISTANT:  MICHAEL Asher.



CHIEF COMPLAINT:  Right foot pain.



HISTORY OF PRESENT ILLNESS:  This is a 70-year-old female who is being seen

on the Med/Surg floor of [facility] for initial pain

management consultation.  The patient was admitted under the care of Dr. Haque status post fall which occurred yesterday.  It is a constant, acute

pain, rating it as 7/10, describing the pain as an aching, throbbing pain,

increasing with movement and touch and reduced with medication. She states

that she had a fall in the bathroom of the [facility] in [place]

and since that time has been having severe ankle pain and foot pain.

X-rays were performed showing no displacement or fractures and is waiting

to be evaluated by the orthopedic surgeon.  The patient had allergies to

hydrocodone and was given morphine and tramadol which patient the reports

has not been effective in relieving the pain, morphine made her feel fine.

Dilaudid was ordered but was never given.  She was started on Percocet

5/325 one tablet every six hours as needed for pain which the patient

reports has been adequately relieving pain.  We were consulted so that the

patient would have adequate pain control while here in the hospital.



PAST MEDICAL HISTORY:  Diabetes mellitus, breast cancer, and high

cholesterol.



PAST SURGICAL HISTORY:  Hysterectomy and lumpectomy removal.



SOCIAL HISTORY:  Denies smoking tobacco, drinking alcohol, or IV drug

use.



ALLERGIES:  Norco and loratadine.



MEDICATIONS:  Crestor, insulin, Actos, folic acid.



REVIEW OF SYSTEMS:  Denies rash, fever, chills, sweating, dizziness,

drowsiness, blurred vision, sore throat, change in her weight.  No

shortness of breath or chest pain.  No nausea, vomiting, diarrhea, or

blood in the stool or urine.  No dysuria.



PHYSICAL EXAMINATION:

GENERAL:  Alert, awake, and oriented.

VITAL SIGNS:  Blood pressure 132/56, heart rate 85, oxygen saturation 98%,

respiratory rate 18, temperature 98.2 degrees Fahrenheit.

HEENT:  PERRLA.

NECK:  Range of motion is full in all directions.  No tenderness to

paracervical muscles.  No adenopathy.

LUNGS:  Decreased breath sounds bilaterally.

HEART:  Regular.

ABDOMEN:  Obese.

BACK:  Range of motion is full in flexion and extension.

EXTREMITIES:  Upper and lower extremity range of motion is decreased due to

the patient's condition.  No cyanosis.  No clubbing.  Sensory is reduced.

Reflexes are not obtainable.  No adenopathy with tenderness to palpation

of the right ankle decreased range of motion, slight swelling noted.



ASSESSMENT AND PLAN:  This is a 70-year-old female with the right ankle

sprain status post fall.  The patient will be continued on Percocet as

needed.  We will discontinue the tramadol, Dilaudid, and morphine.  We

will add lidocaine patch to be applied to the ankle at the site of the

pain 12 hours on and 12 hours off and Bengay cream every 6 hours.  The

patient was discussed with Dr. Peña and Dr. Peña concurred.  We will

follow the patient.



Thank you very much for the courtesy of this consultation.







  ______________________________________________

  Behnoush Zarrini, M.D.





  ______________________________________________

  MICHAEL Asher





DR:  Tamra

D:  11/01/2020 08:06

T:  11/01/2020 08:26

JOB#:  [id number]

CC:



MARTINE

## 2020-11-01 NOTE — NUR
NURSE NOTES:

Paged Dr. Fraser regarding temperature 100.3, , and saturation of 90% on room air, 
patient denies SOB/CP and has no cough. Placed patient on 2L NC.

## 2020-11-01 NOTE — CONSULTATION
History of Present Illness


General


Date patient seen:  Nov 1, 2020


Chief Complaint:  


Referring physician:  


Reason for Consultation:  





Present Illness


Allergies:  


Coded Allergies:  


     HYDROCODONE (Verified  Allergy, Unknown, 10/31/20)


     LORATADINE (Verified  Allergy, Unknown, 10/31/20)


   pt states not feeling well and having rashes after taking


   this medication at a rehab facility





Medication History


Scheduled


Cholecalciferol (Vitamin D3) (Vitamin D3*), 25 MCG PO DAILY, (Reported)


Folic Acid* (Folic Acid*), 1 MG ORAL DAILY, (Reported)


Insulin Glargine (Lantus), 38 SUBQ BEDTIME, (Reported)


Pioglitazone Hcl* (Actos*), 45 MG ORAL DAILY, (Reported)


Rosuvastatin Calcium* (Crestor*), 10 MG ORAL DAILY, (Reported)





Discontinued Medications


Semaglutide (Rybelsus), 0.25 MG PO ONCE A WEEK, (Reported)


   Discontinued Reason: Pt stopped taking med





Patient History


Healthcare decision maker





Resuscitation status





Advanced Directive on File








Physical Exam





Last 24 Hour Vital Signs








  Date Time  Temp Pulse Resp B/P (MAP) Pulse Ox O2 Delivery O2 Flow Rate FiO2


 


11/1/20 04:00 98.2 85 17 132/56 (81) 93   


 


11/1/20 00:00 98.2 88 19 118/51 (73) 94   


 


10/31/20 21:00      Room Air  


 


10/31/20 20:00 98.2 92 18 141/67 (91) 99   


 


10/31/20 16:00 98.2 79 18 135/77 (96) 99   


 


10/31/20 15:46 97.8       


 


10/31/20 13:08 97.8       


 


10/31/20 12:12      Room Air  


 


10/31/20 12:00 97.9 81 18 132/71 (91) 98   


 


10/31/20 10:15 98.3 79 18 142/74 98 Room Air  


 


10/31/20 10:15 97.8 85 18 130/70 (90) 99   

















Intake and Output  


 


 10/31/20 11/1/20





 19:00 07:00


 


Intake Total  900 ml


 


Output Total  225 ml


 


Balance  675 ml


 


  


 


Intake Oral  300 ml


 


IV Total  600 ml


 


Output Urine Total  225 ml


 


# Voids 1 











Laboratory Tests








Test


 10/31/20


08:55 10/31/20


09:35 10/31/20


16:55 10/31/20


20:17


 


White Blood Count


 8.8 K/UL


(4.8-10.8) 


 


 





 


Red Blood Count


 4.72 M/UL


(4.20-5.40) 


 


 





 


Hemoglobin


 13.1 G/DL


(12.0-16.0) 


 


 





 


Hematocrit


 40.3 %


(37.0-47.0) 


 


 





 


Mean Corpuscular Volume 86 FL (80-99)     


 


Mean Corpuscular Hemoglobin


 27.7 PG


(27.0-31.0) 


 


 





 


Mean Corpuscular Hemoglobin


Concent 32.4 G/DL


(32.0-36.0) 


 


 





 


Red Cell Distribution Width


 14.9 %


(11.6-14.8)  H 


 


 





 


Platelet Count


 253 K/UL


(150-450) 


 


 





 


Mean Platelet Volume


 7.1 FL


(6.5-10.1) 


 


 





 


Neutrophils (%) (Auto)


 78.2 %


(45.0-75.0)  H 


 


 





 


Lymphocytes (%) (Auto)


 14.1 %


(20.0-45.0)  L 


 


 





 


Monocytes (%) (Auto)


 6.1 %


(1.0-10.0) 


 


 





 


Eosinophils (%) (Auto)


 1.1 %


(0.0-3.0) 


 


 





 


Basophils (%) (Auto)


 0.6 %


(0.0-2.0) 


 


 





 


Prothrombin Time


 11.0 SEC


(9.30-11.50) 


 


 





 


Prothromb Time International


Ratio 1.0 (0.9-1.1)  


 


 


 





 


Activated Partial


Thromboplast Time 26 SEC (23-33)


 


 


 





 


Sodium Level


 141 MMOL/L


(136-145) 


 


 





 


Potassium Level


 3.6 MMOL/L


(3.5-5.1) 


 


 





 


Chloride Level


 104 MMOL/L


() 


 


 





 


Carbon Dioxide Level


 25 MMOL/L


(21-32) 


 


 





 


Blood Urea Nitrogen


 30 mg/dL


(7-18)  H 


 


 





 


Creatinine


 1.4 MG/DL


(0.55-1.30)  H 


 


 





 


Estimat Glomerular Filtration


Rate 37.2 mL/min


(>60) 


 


 





 


Glucose Level


 97 MG/DL


() 


 


 





 


Uric Acid


 5.1 MG/DL


(2.6-7.2) 


 


 





 


Calcium Level


 9.5 MG/DL


(8.5-10.1) 


 


 





 


Total Bilirubin


 0.4 MG/DL


(0.2-1.0) 


 


 





 


Aspartate Amino Transf


(AST/SGOT) 20 U/L (15-37)


 


 


 





 


Alanine Aminotransferase


(ALT/SGPT) 19 U/L (12-78)


 


 


 





 


Alkaline Phosphatase


 96 U/L


() 


 


 





 


Total Creatine Kinase


 173 U/L


() 


 


 





 


Troponin I


 0.000 ng/mL


(0.000-0.056) 


 


 





 


C-Reactive Protein,


Quantitative 0.8 mg/dL


(0.00-0.90) 


 


 





 


Pro-B-Type Natriuretic Peptide


 99 pg/mL


(0-125) 


 


 





 


Total Protein


 7.6 G/DL


(6.4-8.2) 


 


 





 


Albumin


 4.0 G/DL


(3.4-5.0) 


 


 





 


Globulin 3.6 g/dL     


 


Albumin/Globulin Ratio 1.1 (1.0-2.7)     


 


Urine Color  Pale yellow    


 


Urine Appearance


 


 Slightly


cloudy 


 





 


Urine pH  5 (4.5-8.0)    


 


Urine Specific Gravity


 


 1.025


(1.005-1.035) 


 





 


Urine Protein


 


 3+ (NEGATIVE)


H 


 





 


Urine Glucose (UA)


 


 Negative


(NEGATIVE) 


 





 


Urine Ketones


 


 1+ (NEGATIVE)


H 


 





 


Urine Blood


 


 1+ (NEGATIVE)


H 


 





 


Urine Nitrite


 


 Negative


(NEGATIVE) 


 





 


Urine Bilirubin


 


 Negative


(NEGATIVE) 


 





 


Urine Urobilinogen


 


 Normal MG/DL


(0.0-1.0) 


 





 


Urine Leukocyte Esterase


 


 2+ (NEGATIVE)


H 


 





 


Urine RBC


 


 0-2 /HPF (0 -


2) 


 





 


Urine WBC


 


 2-4 /HPF (0 -


2) 


 





 


Urine Squamous Epithelial


Cells 


 Moderate /LPF


(NONE/OCC)  H 


 





 


Urine Bacteria


 


 Moderate /HPF


(NONE)  H 


 





 


POC Whole Blood Glucose


 


 


 Pending  


 102 MG/DL


()


 


Test


 11/1/20


04:35 11/1/20


06:10 


 





 


White Blood Count


 5.4 K/UL


(4.8-10.8) 


 


 





 


Red Blood Count


 4.09 M/UL


(4.20-5.40)  L 


 


 





 


Hemoglobin


 11.6 G/DL


(12.0-16.0)  L 


 


 





 


Hematocrit


 35.9 %


(37.0-47.0)  L 


 


 





 


Mean Corpuscular Volume 88 FL (80-99)     


 


Mean Corpuscular Hemoglobin


 28.3 PG


(27.0-31.0) 


 


 





 


Mean Corpuscular Hemoglobin


Concent 32.3 G/DL


(32.0-36.0) 


 


 





 


Red Cell Distribution Width


 14.9 %


(11.6-14.8)  H 


 


 





 


Platelet Count


 222 K/UL


(150-450) 


 


 





 


Mean Platelet Volume


 6.7 FL


(6.5-10.1) 


 


 





 


Neutrophils (%) (Auto)


 51.1 %


(45.0-75.0) 


 


 





 


Lymphocytes (%) (Auto)


 31.8 %


(20.0-45.0) 


 


 





 


Monocytes (%) (Auto)


 9.1 %


(1.0-10.0) 


 


 





 


Eosinophils (%) (Auto)


 7.6 %


(0.0-3.0)  H 


 


 





 


Basophils (%) (Auto)


 0.5 %


(0.0-2.0) 


 


 





 


Sodium Level


 142 MMOL/L


(136-145) 


 


 





 


Potassium Level


 4.3 MMOL/L


(3.5-5.1) 


 


 





 


Chloride Level


 107 MMOL/L


() 


 


 





 


Carbon Dioxide Level


 27 MMOL/L


(21-32) 


 


 





 


Anion Gap


 8 mmol/L


(5-15) 


 


 





 


Blood Urea Nitrogen


 28 mg/dL


(7-18)  H 


 


 





 


Creatinine


 1.2 MG/DL


(0.55-1.30) 


 


 





 


Estimat Glomerular Filtration


Rate 44.5 mL/min


(>60) 


 


 





 


Glucose Level


 123 MG/DL


()  H 


 


 





 


Calcium Level


 8.8 MG/DL


(8.5-10.1) 


 


 





 


Total Bilirubin


 0.4 MG/DL


(0.2-1.0) 


 


 





 


Aspartate Amino Transf


(AST/SGOT) 18 U/L (15-37)


 


 


 





 


Alanine Aminotransferase


(ALT/SGPT) 17 U/L (12-78)


 


 


 





 


Alkaline Phosphatase


 76 U/L


() 


 


 





 


Total Protein


 6.7 G/DL


(6.4-8.2) 


 


 





 


Albumin


 3.0 G/DL


(3.4-5.0)  L 


 


 





 


Globulin 3.7 g/dL     


 


Albumin/Globulin Ratio


 0.8 (1.0-2.7)


L 


 


 





 


POC Whole Blood Glucose


 


 99 MG/DL


() 


 














Microbiology








 Date/Time


Source Procedure


Growth Status





 


 10/31/20 09:35


Urine,Clean Catch Urine Culture - Preliminary


NO GROWTH AFTER 24 HOURS Resulted








Height (Feet):  5


Height (Inches):  2.00


Weight (Pounds):  209


Medications





Current Medications








 Medications


  (Trade)  Dose


 Ordered  Sig/Lia


 Route


 PRN Reason  Start Time


 Stop Time Status Last Admin


Dose Admin


 


 Atorvastatin


 Calcium


  (Lipitor)  20 mg  BEDTIME


 ORAL


   10/31/20 21:00


 1/29/21 20:59  10/31/20 20:09





 


 Dextrose


  (Dextrose 50%)  25 ml  Q30M  PRN


 IV


 Hypoglycemia  10/31/20 11:45


 1/29/21 11:44   





 


 Dextrose


  (Dextrose 50%)  50 ml  Q30M  PRN


 IV


 Hypoglycemia  10/31/20 11:45


 1/29/21 11:44   





 


 Folic Acid


  (Folate)  1 mg  DAILY


 ORAL


   11/1/20 09:00


 12/1/20 08:59   





 


 Heparin Sodium


  (Porcine)


  (Heparin 5000


 units/ml)  5,000 units  EVERY 12  HOURS


 SUBQ


   10/31/20 21:00


 12/15/20 20:59  10/31/20 20:11





 


 Hydromorphone HCl


  (Dilaudid)  0.5 mg  Q4H  PRN


 IVP


 For Pain  10/31/20 19:00


 11/7/20 18:59 UNV  





 


 Ibuprofen


  (Motrin)  600 mg  Q8H  PRN


 ORAL


 For Pain  10/31/20 17:45


 11/30/20 17:44  10/31/20 20:10





 


 Insulin Aspart


  (NovoLOG)    BEFORE MEALS AND  HS


 SUBQ


   10/31/20 16:30


 1/29/21 16:29   





 


 Insulin Detemir


  (Levemir)  38 units  BEDTIME


 SUBQ


   10/31/20 21:00


 1/29/21 20:59   





 


 Ondansetron HCl


  (Zofran)  4 mg  Q4H  PRN


 IVP


 Nausea & Vomiting  10/31/20 23:00


 11/30/20 22:59  11/1/20 05:03





 


 Oxycodone/


 Acetaminophen


  (Percocet 5-325)  1 tab  Q6H  PRN


 ORAL


 pain  10/31/20 23:00


 11/7/20 22:59  11/1/20 04:18





 


 Pioglitazone HCl


  (Actos)  45 mg  DAILY


 ORAL


   11/1/20 09:00


 12/1/20 08:59   





 


 Sodium Chloride  1,000 ml @ 


 60 mls/hr  [id number]


 IV


   10/31/20 13:00


 11/30/20 12:59  11/1/20 04:54





 


 Tramadol HCl


  (Ultram)  50 mg  TIDPRN  PRN


 ORAL


 moderate pain 4-6  10/31/20 23:00


 11/7/20 13:59  11/1/20 00:56





 


 Vitamin D


  (Vitamin D)  1,000 intlu  DAILY


 ORAL


   11/1/20 09:00


 12/1/20 08:59   














Assessment/Plan


Assessment/Plan:


(1) Right ankle pain


(2) Right ankle sprain s/p fall


seen dictated











Alberto Francis              Nov 1, 2020 08:00

## 2020-11-02 VITALS — DIASTOLIC BLOOD PRESSURE: 65 MMHG | SYSTOLIC BLOOD PRESSURE: 110 MMHG

## 2020-11-02 VITALS — SYSTOLIC BLOOD PRESSURE: 114 MMHG | DIASTOLIC BLOOD PRESSURE: 70 MMHG

## 2020-11-02 VITALS — DIASTOLIC BLOOD PRESSURE: 67 MMHG | SYSTOLIC BLOOD PRESSURE: 127 MMHG

## 2020-11-02 VITALS — DIASTOLIC BLOOD PRESSURE: 62 MMHG | SYSTOLIC BLOOD PRESSURE: 124 MMHG

## 2020-11-02 VITALS — DIASTOLIC BLOOD PRESSURE: 55 MMHG | SYSTOLIC BLOOD PRESSURE: 109 MMHG

## 2020-11-02 VITALS — DIASTOLIC BLOOD PRESSURE: 59 MMHG | SYSTOLIC BLOOD PRESSURE: 118 MMHG

## 2020-11-02 LAB
ADD MANUAL DIFF: NO
ANION GAP SERPL CALC-SCNC: 7 MMOL/L (ref 5–15)
BASOPHILS NFR BLD AUTO: 0.9 % (ref 0–2)
BUN SERPL-MCNC: 28 MG/DL (ref 7–18)
CALCIUM SERPL-MCNC: 8.4 MG/DL (ref 8.5–10.1)
CHLORIDE SERPL-SCNC: 103 MMOL/L (ref 98–107)
CO2 SERPL-SCNC: 27 MMOL/L (ref 21–32)
CREAT SERPL-MCNC: 1.3 MG/DL (ref 0.55–1.3)
EOSINOPHIL NFR BLD AUTO: 8.4 % (ref 0–3)
ERYTHROCYTE [DISTWIDTH] IN BLOOD BY AUTOMATED COUNT: 14.4 % (ref 11.6–14.8)
HCT VFR BLD CALC: 30.7 % (ref 37–47)
HGB BLD-MCNC: 9.8 G/DL (ref 12–16)
LYMPHOCYTES NFR BLD AUTO: 33.4 % (ref 20–45)
MCV RBC AUTO: 88 FL (ref 80–99)
MONOCYTES NFR BLD AUTO: 9.5 % (ref 1–10)
NEUTROPHILS NFR BLD AUTO: 47.8 % (ref 45–75)
PLATELET # BLD: 169 K/UL (ref 150–450)
POTASSIUM SERPL-SCNC: 4 MMOL/L (ref 3.5–5.1)
RBC # BLD AUTO: 3.5 M/UL (ref 4.2–5.4)
SODIUM SERPL-SCNC: 137 MMOL/L (ref 136–145)
WBC # BLD AUTO: 4.9 K/UL (ref 4.8–10.8)

## 2020-11-02 RX ADMIN — INSULIN ASPART SCH UNITS: 100 INJECTION, SOLUTION INTRAVENOUS; SUBCUTANEOUS at 12:18

## 2020-11-02 RX ADMIN — INSULIN ASPART SCH UNITS: 100 INJECTION, SOLUTION INTRAVENOUS; SUBCUTANEOUS at 18:14

## 2020-11-02 RX ADMIN — INSULIN DETEMIR SCH UNITS: 100 INJECTION, SOLUTION SUBCUTANEOUS at 20:08

## 2020-11-02 RX ADMIN — ANALGESIC BALM SCH APPLIC: 1.74; 4.06 OINTMENT TOPICAL at 20:08

## 2020-11-02 RX ADMIN — ANALGESIC BALM SCH APPLIC: 1.74; 4.06 OINTMENT TOPICAL at 12:19

## 2020-11-02 RX ADMIN — ANALGESIC BALM SCH APPLIC: 1.74; 4.06 OINTMENT TOPICAL at 09:41

## 2020-11-02 RX ADMIN — INSULIN ASPART SCH UNITS: 100 INJECTION, SOLUTION INTRAVENOUS; SUBCUTANEOUS at 06:24

## 2020-11-02 RX ADMIN — HEPARIN SODIUM SCH UNITS: 5000 INJECTION INTRAVENOUS; SUBCUTANEOUS at 09:36

## 2020-11-02 RX ADMIN — OXYCODONE HYDROCHLORIDE AND ACETAMINOPHEN PRN TAB: 5; 325 TABLET ORAL at 09:36

## 2020-11-02 RX ADMIN — HEPARIN SODIUM SCH UNITS: 5000 INJECTION INTRAVENOUS; SUBCUTANEOUS at 20:05

## 2020-11-02 RX ADMIN — ANALGESIC BALM SCH APPLIC: 1.74; 4.06 OINTMENT TOPICAL at 18:13

## 2020-11-02 RX ADMIN — VITAMIN D, TAB 1000IU (100/BT) SCH INTLU: 25 TAB at 09:35

## 2020-11-02 RX ADMIN — INSULIN ASPART SCH UNITS: 100 INJECTION, SOLUTION INTRAVENOUS; SUBCUTANEOUS at 20:05

## 2020-11-02 RX ADMIN — ATORVASTATIN CALCIUM SCH MG: 20 TABLET, FILM COATED ORAL at 20:02

## 2020-11-02 RX ADMIN — OXYCODONE HYDROCHLORIDE AND ACETAMINOPHEN PRN TAB: 5; 325 TABLET ORAL at 18:18

## 2020-11-02 RX ADMIN — OXYCODONE HYDROCHLORIDE AND ACETAMINOPHEN PRN TAB: 5; 325 TABLET ORAL at 00:31

## 2020-11-02 NOTE — NUR
NURSE NOTES:

Patient c/o itching on left buttock, noted with blanchable redness, no open wound. Calazime 
cream applied for protection. Will monitor. Patient also c/o left lower back pain and is 
requesting a patch or bengay cream for it. Left Message to MICHAEL Francis.

## 2020-11-02 NOTE — GENERAL PROGRESS NOTE
Subjective


Date patient seen:  Nov 2, 2020


Time patient seen:  07:30 - am


Allergies:  


Coded Allergies:  


     HYDROCODONE (Verified  Allergy, Unknown, 10/31/20)


     LORATADINE (Verified  Allergy, Unknown, 10/31/20)


   pt states not feeling well and having rashes after taking


   this medication at a rehab facility


Subjective


HISTORY OF PRESENT ILLNESS:  This is a 70-year-old female who is being seen


on the Med/Surg floor of [facility]. Patient showing 


no signs of pain or distress. Pain has been at a moderate level. No


new complaints at this time. 





REVIEW OF SYSTEMS:  Denies rash, fever, chills, sweating, dizziness,


drowsiness, blurred vision, sore throat, change in her weight.  No


shortness of breath or chest pain.  No nausea, vomiting, diarrhea, or


blood in the stool or urine.  No dysuria.





Objective





Last 24 Hour Vital Signs








  Date Time  Temp Pulse Resp B/P (MAP) Pulse Ox O2 Delivery O2 Flow Rate FiO2


 


11/2/20 03:55 97.8 89  127/67 (87)    


 


11/2/20 00:28 98.0 83  124/62 (82)    


 


11/1/20 21:00      Nasal Cannula 2.0 


 


11/1/20 20:19     94 Nasal Cannula 2.0 28


 


11/1/20 20:00 99.3 103 18 124/69 (87) 94   


 


11/1/20 19:14     95 Nasal Cannula 2.0 28


 


11/1/20 16:45 99.3       


 


11/1/20 16:00 100.3 103 18 153/71 (98) 90   


 


11/1/20 12:00 99.6 97 18 156/72 (100) 92   


 


11/1/20 09:00      Room Air  

















Intake and Output  


 


 11/1/20 11/2/20





 19:00 07:00


 


Intake Total 1320 ml 900 ml


 


Output Total 550 ml 50 ml


 


Balance 770 ml 850 ml


 


  


 


Intake Oral 600 ml 200 ml


 


IV Total 720 ml 700 ml


 


Output Urine Total 550 ml 50 ml








Laboratory Tests


11/1/20 12:03: POC Whole Blood Glucose 82


11/1/20 16:32: POC Whole Blood Glucose 127H


11/1/20 20:46: POC Whole Blood Glucose 138H


11/2/20 04:55: 


White Blood Count 4.9, Red Blood Count 3.50L, Hemoglobin 9.8L, Hematocrit 30.7L,

Mean Corpuscular Volume 88, Mean Corpuscular Hemoglobin 28.1, Mean Corpuscular 

Hemoglobin Concent 32.1, Red Cell Distribution Width 14.4, Platelet Count 169, 

Mean Platelet Volume 6.3L, Neutrophils (%) (Auto) 47.8, Lymphocytes (%) (Auto) 

33.4, Monocytes (%) (Auto) 9.5, Eosinophils (%) (Auto) 8.4H, Basophils (%) 

(Auto) 0.9


11/2/20 06:06: POC Whole Blood Glucose 133H


Height (Feet):  5


Height (Inches):  2.00


Weight (Pounds):  209


Objective


PHYSICAL EXAMINATION:


GENERAL:  Alert, awake, and oriented.


LUNGS:  Decreased breath sounds bilaterally.


HEART:  S1S2 Regular.


ABDOMEN:  Obese


EXTREMITIES:  with tenderness to palpation of the right ankle 


decreased range of motion, slight swelling noted.


NEURO: No changes





Assessment/Plan


Assessment/Plan:


(1) Right ankle pain


(2) Right ankle sprain s/p fall


Patient to be continued on Percocet 


D/w Dr. Peña and he concurred.











Alberto Francis              Nov 2, 2020 08:40

## 2020-11-02 NOTE — INFECTIOUS DISEASES PROG NOTE
Assessment/Plan








Assessment/Plan:


69 yo female with PHMx of DM and Breast CA ( Status post resection) who 

presented to the ED after a fall. 





Pyuria


    UA  Increased LE and Mod Ryan seen; ucx NTD


    Asymptomatic





sp Fall 


Low grade fever x 1


No Leukocytosis


 


DM


Breast CA ( Status post resection)


Right Ankle Fx  2019





PLAN


- Monitor off abx unless persistent febrile or high fever, leukocytosis and/or 

HD instability


- f/u Cultures


- Monitor CBC and Temps


-CXR





Thank you for this consult. [facility] will continue to follow Ms. Morales while 

she hiospitalized.





Subjective


Allergies:  


Coded Allergies:  


     HYDROCODONE (Verified  Allergy, Unknown, 10/31/20)


     LORATADINE (Verified  Allergy, Unknown, 10/31/20)


   pt states not feeling well and having rashes after taking


   this medication at a rehab facility


Tm 100.3; afebrile in ~24hrs


no leukocytosis 


at 2l NC


Cr improving





Objective





Last 24 Hour Vital Signs








  Date Time  Temp Pulse Resp B/P (MAP) Pulse Ox O2 Delivery O2 Flow Rate FiO2


 


11/2/20 12:00 98.0 77 18 109/55 (73) 95   


 


11/2/20 10:06 97.8       


 


11/2/20 09:00      Nasal Cannula 2.0 


 


11/2/20 08:00 98.3 73 18 110/65 (80) 98   


 


11/2/20 03:55 97.8 89  127/67 (87)    


 


11/2/20 00:28 98.0 83  124/62 (82)    


 


11/1/20 21:00      Nasal Cannula 2.0 


 


11/1/20 20:19     94 Nasal Cannula 2.0 28


 


11/1/20 20:00 99.3 103 18 124/69 (87) 94   


 


11/1/20 19:14     95 Nasal Cannula 2.0 28


 


11/1/20 16:45 99.3       


 


11/1/20 16:00 100.3 103 18 153/71 (98) 90   








Height (Feet):  5


Height (Inches):  2.00


Weight (Pounds):  209


GEN: NAD


HEENT: NCAT, MMM, EOMI, No Scleral icterus, 


Neck: No LAD, Supple


LUNGS: CTAB, No W


HEART: RRR, S1, S2. 


ABD: Soft, NT, Mild Distension


Skin: No rash,, Normal in color


Ext. Some pain with on ambulation right LE


NEURO: A/O x 4, No focal deficits





Microbiology








 Date/Time


Source Procedure


Growth Status





 


 11/1/20 17:45


Nasopharynx SARS-CoV-2 RdRp Gene Assay - Final Complete


 


 10/31/20 09:35


Urine,Clean Catch Urine Culture - Preliminary


NO GROWTH AFTER 24 HOURS Resulted











Laboratory Tests








Test


 11/1/20


16:32 11/1/20


20:46 11/2/20


04:55 11/2/20


06:06


 


POC Whole Blood Glucose


 127 MG/DL


()  H 138 MG/DL


()  H 


 133 MG/DL


()  H


 


White Blood Count


 


 


 4.9 K/UL


(4.8-10.8) 





 


Red Blood Count


 


 


 3.50 M/UL


(4.20-5.40)  L 





 


Hemoglobin


 


 


 9.8 G/DL


(12.0-16.0)  L 





 


Hematocrit


 


 


 30.7 %


(37.0-47.0)  L 





 


Mean Corpuscular Volume   88 FL (80-99)   


 


Mean Corpuscular Hemoglobin


 


 


 28.1 PG


(27.0-31.0) 





 


Mean Corpuscular Hemoglobin


Concent 


 


 32.1 G/DL


(32.0-36.0) 





 


Red Cell Distribution Width


 


 


 14.4 %


(11.6-14.8) 





 


Platelet Count


 


 


 169 K/UL


(150-450) 





 


Mean Platelet Volume


 


 


 6.3 FL


(6.5-10.1)  L 





 


Neutrophils (%) (Auto)


 


 


 47.8 %


(45.0-75.0) 





 


Lymphocytes (%) (Auto)


 


 


 33.4 %


(20.0-45.0) 





 


Monocytes (%) (Auto)


 


 


 9.5 %


(1.0-10.0) 





 


Eosinophils (%) (Auto)


 


 


 8.4 %


(0.0-3.0)  H 





 


Basophils (%) (Auto)


 


 


 0.9 %


(0.0-2.0) 





 


Test


 11/2/20


10:50 11/2/20


11:50 


 





 


Sodium Level


 137 MMOL/L


(136-145) 


 


 





 


Potassium Level


 4.0 MMOL/L


(3.5-5.1) 


 


 





 


Chloride Level


 103 MMOL/L


() 


 


 





 


Carbon Dioxide Level


 27 MMOL/L


(21-32) 


 


 





 


Anion Gap


 7 mmol/L


(5-15) 


 


 





 


Blood Urea Nitrogen


 28 mg/dL


(7-18)  H 


 


 





 


Creatinine


 1.3 MG/DL


(0.55-1.30) 


 


 





 


Estimat Glomerular Filtration


Rate 40.6 mL/min


(>60) 


 


 





 


Glucose Level


 168 MG/DL


()  H 


 


 





 


Calcium Level


 8.4 MG/DL


(8.5-10.1)  L 


 


 





 


POC Whole Blood Glucose


 


 159 MG/DL


()  H 


 














Current Medications








 Medications


  (Trade)  Dose


 Ordered  Sig/Lia


 Route


 PRN Reason  Start Time


 Stop Time Status Last Admin


Dose Admin


 


 Acetaminophen


  (Tylenol)  650 mg  Q6H  PRN


 ORAL


 TEMP >100.4  11/1/20 17:45


 12/1/20 17:44   





 


 Atorvastatin


 Calcium


  (Lipitor)  20 mg  BEDTIME


 ORAL


   10/31/20 21:00


 1/29/21 20:59  11/1/20 20:47





 


 Dextrose


  (Dextrose 50%)  25 ml  Q30M  PRN


 IV


 Hypoglycemia  10/31/20 11:45


 1/29/21 11:44   





 


 Dextrose


  (Dextrose 50%)  50 ml  Q30M  PRN


 IV


 Hypoglycemia  10/31/20 11:45


 1/29/21 11:44   





 


 Folic Acid


  (Folate)  1 mg  DAILY


 ORAL


   11/1/20 09:00


 12/1/20 08:59  11/2/20 09:35





 


 Heparin Sodium


  (Porcine)


  (Heparin 5000


 units/ml)  5,000 units  EVERY 12  HOURS


 SUBQ


   10/31/20 21:00


 12/15/20 20:59  11/2/20 09:36





 


 Ibuprofen


  (Motrin)  600 mg  Q8H  PRN


 ORAL


 For Pain  10/31/20 17:45


 11/30/20 17:44  11/2/20 06:23





 


 Insulin Aspart


  (NovoLOG)    BEFORE MEALS AND  HS


 SUBQ


   10/31/20 16:30


 1/29/21 16:29  11/2/20 12:18





 


 Insulin Detemir


  (Levemir)  38 units  BEDTIME


 SUBQ


   10/31/20 21:00


 1/29/21 20:59   





 


 Lidocaine


  (Lidoderm 5%


 PATCH)  1 patch  DAILY


 TDERMAL


   11/1/20 09:00


 1/30/21 08:59  11/2/20 09:38





 


 Menthol/Methyl


 Salicylate


  (Bengay)  1 applic  FOUR TIMES A  DAY


 TOPIC


   11/1/20 10:00


 12/1/20 09:59  11/2/20 12:19





 


 Ondansetron HCl


  (Zofran)  4 mg  Q4H  PRN


 IVP


 Nausea & Vomiting  10/31/20 23:00


 11/30/20 22:59  11/1/20 05:03





 


 Oxycodone/


 Acetaminophen


  (Percocet 5-325)  1 tab  Q6H  PRN


 ORAL


 pain  10/31/20 23:00


 11/7/20 22:59  11/2/20 09:36





 


 Pioglitazone HCl


  (Actos)  45 mg  DAILY


 ORAL


   11/1/20 09:00


 12/1/20 08:59  11/2/20 09:35





 


 Sodium Chloride  1,000 ml @ 


 60 mls/hr  [id number]


 IV


   10/31/20 13:00


 11/30/20 12:59  11/1/20 20:49





 


 Vitamin D


  (Vitamin D)  1,000 intlu  DAILY


 ORAL


   11/1/20 09:00


 12/1/20 08:59  11/2/20 09:35




















Radha Colin M.D.             Nov 2, 2020 13:13

## 2020-11-02 NOTE — NUR
CASE MANAGEMENT:INITIAL REVIEW



70 YR OLD FEMALE BIBA FROM HOME



CC;PAIN



SI;FTT. RLL SPRAIN. FALL. RENAL INSUFFICIENCY.

98.1   103   20   157/83   98% ON RA

BUN 30   CR 1.4   

UA+ PROTEIN, KETONES, BLOOD, LEUKOCYTE ESTERASE, 

SQUAMOUS EPITH CELLS, BACTERIA

COVID RAPID ~ NEGATIVE

URINE CX ~ NO GROWTH

RT ANKLE XRAY ~ No acute fracture or dislocation.

 Significant generalized osteopenia.

RT TIP/FIB XRAY ~ No radiographic evidence of acute fracture or dislocation.

 Severe generalized osteopenia.

FOOT XRAY ~   No displaced fracture or dislocation identified.



IS;TRAMADOL PO

MOTRIN PO

IVF NS



****ADMITTED TO MED SURG****

******MED SURG STATUS******

DCP;FROM HOME



***********************************************************************************



CASE MANAGEMENT:REVIEW



11/2/20



SI;RENAL INSUFFICIENCY. RLE SPRAIN.

98.3   89   18   127/67   95% 2L NC

BUN 28      CA 8.4



IS;PERCOCET PO 

HEPARIN SUBQ Q12

IVF NS @ 60 ML/HR



****MED SURG STATUS****

DCP;FROM HOME



PLAN;

ARU EVAL

## 2020-11-02 NOTE — DIAGNOSTIC IMAGING REPORT
Indication: Cough

 

Technique: One view of the chest

 

Comparison: none

 

Findings: The heart is enlarged. There is some atelectasis at the right lung base.

Lungs and pleural spaces are otherwise clear. There are left axillary surgical clips.

 

Impression: Cardiomegaly. No acute process

## 2020-11-02 NOTE — NUR
NURSE NOTES:

Received report from Kristi RN, pt a/a/o x4 laying in bed with no signs of distress or other 
issues at this time. per night shift report pt was not able to void, bladder scan done with 
614ml noted. per MD orders to straight cath Q6H PRN if residual more than 300ml. RN will 
review order and will carry on orders as indicated. call light within reach, bed in lowest 
position. I will f/u as needed.

## 2020-11-02 NOTE — NUR
RD ASSESSMENT & RECOMMENDATIONS

SEE CARE ACTIVITY FOR COMPLETE ASSESSMENT



DAILY ESTIMATED NEEDS:

Needs based on DM, obese/ 61kg abw 

23-28  kcals/kg 

8715-5735  total kcals

1-1.5  g protein/kg

61-92  g total protein 

25-30  mL/kg

7229-8044  total fluid mLs



NUTRITION DIAGNOSIS:

Altered nutrition related lab values R/T diabetes as evidenced by mildly

elev POC glu (), on oral hypoglycemic + long acting + short acting

insulin.





[facility]     



 



PO DIET RECOMMENDATIONS:

[facility] 



 



ADDITIONAL RECOMMENDATIONS:

* Calibrated bedscale wt for accurate CBW 

* Consider holding or DC HS Levemir to prevent hypoglycemia in AM 

   suman w/ variable intake 

* Check A1C for eval of glycemic control

## 2020-11-02 NOTE — NUR
NURSE HAND-OFF: 



Important Events on Shift:[pain management; back and right foot pain; unable to 
void-straight cath ordered- patient wants to finish breakfast first]

Patient Status: [eating breakfast]

Diet: [CCHO medium]



Pending Orders: []

Pending Results/Labs:[]

Pending MD notification:[Awaiting clarification from Tito re: patch/cream for the back 
pain]



Latest Vital Signs: Temperature 97.8 , Pulse 89 , B/P 127 /67 , Respiratory Rate 18 , O2 SAT 
94 , Nasal Cannula, O2 Flow Rate 2.0 .  

Vital Sign Comment: []



Latest Krishnamurthy Fall Score: 60  

Fall Risk: High Risk 

Safety Measures: Call light Within Reach, Bed Alarm Zone 1, Side Rails Side Rails x3, Bed 
position Low and Locked.

Fall Precautions: 

Yellow Socks

Door Sign

Patient Fall Education



Report given to [Alejandro ERICKSON].

## 2020-11-02 NOTE — NUR
NURSE NOTES:

Received report from DRE Allen. AAO x 4, on room air. IV site intact and running IVF. 
Commode and walker at bedside. R lower leg swollen noted. Denies any pain. Encouraged I/S. 
Bed locked, lowest position, alarm on, side rails up, call light within reach. Will continue 
to monitor.

## 2020-11-02 NOTE — NUR
Social Work

This SW received a consult to assist with a home safety evaluation. This SW met with patient 
who remains alert/oriented. P.T at bedside who explains pending progress with pain 
management, possible need for SNF vs home care. Acute Rehab services explained to patient 
(along with qualifications for this level of care). Patient explains she prefers to 
discharge to home with home care, as able and plans to hire a caregiver. Patient lives alone 
in [place] in an apartment, no steps to get into the home from her garage. No history 
or substance abuse or  mental health concerns present at this time. SW to follow further, as 
needed. Patient appears to have a plan to hire a caregiver, while agreeable to going to a 
rehab facility as needed.

## 2020-11-02 NOTE — GENERAL PROGRESS NOTE
Subjective


Constitutional:  Reports: weakness


Allergies:  


Coded Allergies:  


     HYDROCODONE (Verified  Allergy, Unknown, 10/31/20)


     LORATADINE (Verified  Allergy, Unknown, 10/31/20)


   pt states not feeling well and having rashes after taking


   this medication at a rehab facility


All Systems:  reviewed and negative except above


Subjective


sl general pain





Objective





Last 24 Hour Vital Signs








  Date Time  Temp Pulse Resp B/P (MAP) Pulse Ox O2 Delivery O2 Flow Rate FiO2


 


11/2/20 03:55 97.8 89  127/67 (87)    


 


11/2/20 00:28 98.0 83  124/62 (82)    


 


11/1/20 21:00      Nasal Cannula 2.0 


 


11/1/20 20:19     94 Nasal Cannula 2.0 28


 


11/1/20 20:00 99.3 103 18 124/69 (87) 94   


 


11/1/20 19:14     95 Nasal Cannula 2.0 28


 


11/1/20 16:45 99.3       


 


11/1/20 16:00 100.3 103 18 153/71 (98) 90   


 


11/1/20 12:00 99.6 97 18 156/72 (100) 92   

















Intake and Output  


 


 11/1/20 11/2/20





 19:00 07:00


 


Intake Total 1320 ml 900 ml


 


Output Total 550 ml 50 ml


 


Balance 770 ml 850 ml


 


  


 


Intake Oral 600 ml 200 ml


 


IV Total 720 ml 700 ml


 


Output Urine Total 550 ml 50 ml








Laboratory Tests


11/1/20 12:03: POC Whole Blood Glucose 82


11/1/20 16:32: POC Whole Blood Glucose 127H


11/1/20 20:46: POC Whole Blood Glucose 138H


11/2/20 04:55: 


White Blood Count 4.9, Red Blood Count 3.50L, Hemoglobin 9.8L, Hematocrit 30.7L,

Mean Corpuscular Volume 88, Mean Corpuscular Hemoglobin 28.1, Mean Corpuscular 

Hemoglobin Concent 32.1, Red Cell Distribution Width 14.4, Platelet Count 169, 

Mean Platelet Volume 6.3L, Neutrophils (%) (Auto) 47.8, Lymphocytes (%) (Auto) 

33.4, Monocytes (%) (Auto) 9.5, Eosinophils (%) (Auto) 8.4H, Basophils (%) 

(Auto) 0.9


11/2/20 06:06: POC Whole Blood Glucose 133H


Height (Feet):  5


Height (Inches):  2.00


Weight (Pounds):  209


General Appearance:  lethargic


EENT:  normal ENT inspection


Neck:  normal alignment


Cardiovascular:  normal peripheral pulses, normal rate, regular rhythm


Respiratory/Chest:  chest wall non-tender, lungs clear, normal breath sounds


Abdomen:  normal bowel sounds, non tender, soft


Extremities:  normal inspection


Edema:  no edema noted Arm (L), no edema noted Arm (R), no edema noted Leg (L), 

no edema noted Leg (R), no edema noted Pedal (L), no edema noted Pedal (R), no 

edema noted Generalized


Neurologic:  responsive, motor weakness


Skin:  normal pigmentation, warm/dry





Assessment/Plan


Problem List:  


(1) Diabetes


ICD Codes:  E11.9 - Type 2 diabetes mellitus without complications


SNOMED:  03455537, 415334118, [id number]


Qualifiers:  


   Qualified Codes:  E11.9 - Type 2 diabetes mellitus without complications; 

Z79.4 - Long term (current) use of insulin


(2) Renal insufficiency


ICD Codes:  N28.9 - Disorder of kidney and ureter, unspecified


SNOMED:  667499493, 179197277


(3) Fall


ICD Codes:  W19.XXXA - Unspecified fall, initial encounter


SNOMED:  4418261, 659917849, [id number]


Qualifiers:  


   Qualified Codes:  W19.XXXA - Unspecified fall, initial encounter


(4) Unable to ambulate


ICD Codes:  R26.2 - Difficulty in walking, not elsewhere classified


SNOMED:  603782254, 395322926, [id number]


(5) Sprain of right lower leg


ICD Codes:  S83.91XA - Sprain of unspecified site of right knee, initial 

encounter


SNOMED:  903046331, 537653880, [id number]


Qualifiers:  


   Qualified Codes:  S83.91XA - Sprain of unspecified site of right knee, 

initial encounter


Status:  unchanged


Assessment/Plan:


pt diet pain control abx cbc bmp am aru Dominguez Gerard DO         Nov 2, 2020 09:27

## 2020-11-02 NOTE — NUR
***INSURANCE***



CLINICALS (10/31-11/02) FAXED TO [facility] 

 [phone]

## 2020-11-02 NOTE — NUR
NURSE HAND-OFF: 



Important Events on Shift: pt was able to walk around the room with physical therapy and the 
used of a FWW.

Patient Status: stable/full code

Diet: CCHO medium diet 



Pending Orders: []

Pending Results/Labs:am labs

Pending MD notification:[]



Latest Vital Signs: Temperature 98.3 , Pulse 78 , B/P 114 /70 , Respiratory Rate 18 , O2 SAT 
96 , Nasal Cannula, O2 Flow Rate 2.0 .  

Vital Sign Comment: []



Latest Krishnamurthy Fall Score: 95  

Fall Risk: High Risk 

Safety Measures: Call light Within Reach, Bed Alarm Zone 1, Side Rails Side Rails x3, Bed 
position Low and Locked.

Fall Precautions: yes, pt needs assistance to use the commode

Yellow Socks

Door Sign

Patient Fall Education



Report given to Kerrie ERICKSON, pt in stable condition. 

- During my shift pt was able to ambulate with physical therapy and the use of a FWW.

- pt was able to void with no burning/pain.

## 2020-11-02 NOTE — NUR
NURSE NOTES:

Patient was unable to void. Patient verbalized "feeling of needing to go" Tried running 
water, warm towel over the pelvis and sitting up the patient; ineffective. Bladder scan 
showed 614 cc. Notified Dr. Haque, awaiting for response.

## 2020-11-03 VITALS — DIASTOLIC BLOOD PRESSURE: 63 MMHG | SYSTOLIC BLOOD PRESSURE: 121 MMHG

## 2020-11-03 VITALS — DIASTOLIC BLOOD PRESSURE: 61 MMHG | SYSTOLIC BLOOD PRESSURE: 127 MMHG

## 2020-11-03 VITALS — DIASTOLIC BLOOD PRESSURE: 63 MMHG | SYSTOLIC BLOOD PRESSURE: 137 MMHG

## 2020-11-03 VITALS — SYSTOLIC BLOOD PRESSURE: 120 MMHG | DIASTOLIC BLOOD PRESSURE: 66 MMHG

## 2020-11-03 VITALS — SYSTOLIC BLOOD PRESSURE: 109 MMHG | DIASTOLIC BLOOD PRESSURE: 66 MMHG

## 2020-11-03 VITALS — SYSTOLIC BLOOD PRESSURE: 145 MMHG | DIASTOLIC BLOOD PRESSURE: 73 MMHG

## 2020-11-03 LAB
ADD MANUAL DIFF: NO
ANION GAP SERPL CALC-SCNC: 7 MMOL/L (ref 5–15)
BASOPHILS NFR BLD AUTO: 0.7 % (ref 0–2)
BUN SERPL-MCNC: 30 MG/DL (ref 7–18)
CALCIUM SERPL-MCNC: 7.9 MG/DL (ref 8.5–10.1)
CHLORIDE SERPL-SCNC: 102 MMOL/L (ref 98–107)
CO2 SERPL-SCNC: 24 MMOL/L (ref 21–32)
CREAT SERPL-MCNC: 1.5 MG/DL (ref 0.55–1.3)
EOSINOPHIL NFR BLD AUTO: 9.4 % (ref 0–3)
ERYTHROCYTE [DISTWIDTH] IN BLOOD BY AUTOMATED COUNT: 14.6 % (ref 11.6–14.8)
HCT VFR BLD CALC: 33.5 % (ref 37–47)
HGB BLD-MCNC: 10.3 G/DL (ref 12–16)
LYMPHOCYTES NFR BLD AUTO: 23.2 % (ref 20–45)
MCV RBC AUTO: 90 FL (ref 80–99)
MONOCYTES NFR BLD AUTO: 9.4 % (ref 1–10)
NEUTROPHILS NFR BLD AUTO: 57.4 % (ref 45–75)
PLATELET # BLD: 186 K/UL (ref 150–450)
POTASSIUM SERPL-SCNC: 3.8 MMOL/L (ref 3.5–5.1)
RBC # BLD AUTO: 3.72 M/UL (ref 4.2–5.4)
SODIUM SERPL-SCNC: 133 MMOL/L (ref 136–145)
WBC # BLD AUTO: 5.6 K/UL (ref 4.8–10.8)

## 2020-11-03 RX ADMIN — OXYCODONE HYDROCHLORIDE AND ACETAMINOPHEN PRN TAB: 5; 325 TABLET ORAL at 01:30

## 2020-11-03 RX ADMIN — INSULIN ASPART SCH UNITS: 100 INJECTION, SOLUTION INTRAVENOUS; SUBCUTANEOUS at 20:58

## 2020-11-03 RX ADMIN — INSULIN ASPART SCH UNITS: 100 INJECTION, SOLUTION INTRAVENOUS; SUBCUTANEOUS at 16:27

## 2020-11-03 RX ADMIN — HEPARIN SODIUM SCH UNITS: 5000 INJECTION INTRAVENOUS; SUBCUTANEOUS at 20:41

## 2020-11-03 RX ADMIN — VITAMIN D, TAB 1000IU (100/BT) SCH INTLU: 25 TAB at 09:07

## 2020-11-03 RX ADMIN — ANALGESIC BALM SCH APPLIC: 1.74; 4.06 OINTMENT TOPICAL at 12:09

## 2020-11-03 RX ADMIN — ANALGESIC BALM SCH APPLIC: 1.74; 4.06 OINTMENT TOPICAL at 17:10

## 2020-11-03 RX ADMIN — ATORVASTATIN CALCIUM SCH MG: 20 TABLET, FILM COATED ORAL at 20:56

## 2020-11-03 RX ADMIN — ANALGESIC BALM SCH APPLIC: 1.74; 4.06 OINTMENT TOPICAL at 09:04

## 2020-11-03 RX ADMIN — INSULIN DETEMIR SCH UNITS: 100 INJECTION, SOLUTION SUBCUTANEOUS at 20:52

## 2020-11-03 RX ADMIN — OXYCODONE HYDROCHLORIDE AND ACETAMINOPHEN PRN TAB: 5; 325 TABLET ORAL at 20:56

## 2020-11-03 RX ADMIN — HEPARIN SODIUM SCH UNITS: 5000 INJECTION INTRAVENOUS; SUBCUTANEOUS at 09:05

## 2020-11-03 RX ADMIN — INSULIN ASPART SCH UNITS: 100 INJECTION, SOLUTION INTRAVENOUS; SUBCUTANEOUS at 12:10

## 2020-11-03 RX ADMIN — ANALGESIC BALM SCH APPLIC: 1.74; 4.06 OINTMENT TOPICAL at 20:56

## 2020-11-03 RX ADMIN — INSULIN ASPART SCH UNITS: 100 INJECTION, SOLUTION INTRAVENOUS; SUBCUTANEOUS at 05:42

## 2020-11-03 RX ADMIN — OXYCODONE HYDROCHLORIDE AND ACETAMINOPHEN PRN TAB: 5; 325 TABLET ORAL at 13:23

## 2020-11-03 NOTE — NUR
SI:   ACUTE KIDNEY INJURY, SPRAIN ANKLE

T. 97.7 HR 82 RR 20 B/P 109/66

 CA 7.9 BUN 30 CR. 1.5







IS:     ACTOS PO

HEPARIN SUBC

*******MED/SURG STATUS******

## 2020-11-03 NOTE — DIAGNOSTIC IMAGING REPORT
Exam: MR RIGHT FOOT Without Contrast

 

CLINICAL HISTORY:

  71 Y/O FEMALE IP pt WITH RIGHT FOOT PAIN AND SWELLING PRIOR X-RAY OF 

RIGHT FOOT DONE 10/31

 

TECHNIQUE:

  Multiplanar magnetic resonance images of the right midfoot and forefoot 

without intravenous contrast. Skin marker is placed in dorsal aspect of 

middle cuneiform.  

 

COMPARISON:

  May foot radiographs from 10/31/20

 

FINDINGS:

  Tendons and ligaments: Intact

  Fluid:  Unremarkable.  No joint effusion.

  Bones/joints:  Mild to moderate osteoarthritic changes.  Bone marrow 

has normal signal.

  Soft tissues:  Mild subcutaneous soft tissue edema predominantly in the 

dorsum of mid foot and forefoot without fluid collection.

 

IMPRESSION:     

 

No fracture or osteomyelitis.

 

Mild subcutaneous soft tissue edema predominantly in the dorsum of mid 

foot and forefoot without fluid collection.

## 2020-11-03 NOTE — NUR
NURSE NOTES:

Received report & pt from DRE Allen. Pt in bed, a&ox4, in room air. No s/s of acute 
distress & c/o 6/10 pain. Will give pain med when due & pt verbalized understanding. IV site 
intact & S/L'd. Scheduled for MRI thoracic spine tomorrow AM however pt refuses it stating 
she didn't need it & she doesn't trust the doctor who just came by. Pt also states she would 
rather go to her doctor in [place] for the MRI to be done if recommended by her MD & 
not here. Will endorse to AM shift & f/u with radiology in the AM. Plan of care discussed.

## 2020-11-03 NOTE — NUR
*-*DISCHARGE PLANNING*-*



PATIENT HAS BEEN REFERRED TO:



[facility]

 P: [phone]

S/W [facility], CANNOT ACCEPT PATIENT DUE TO INSURANCE, NO COVER WITH BLUE CROSS.

## 2020-11-03 NOTE — NUR
NURSE HAND-OFF: 



Important Events on Shift:voided well, pain

Patient Status: stable

Diet: CCHO M



Pending Orders: N

Pending Results/Labs:AM labs

Pending MD notification:N



Latest Vital Signs: Temperature 98.1 , Pulse 84 , B/P 127 /61 , Respiratory Rate 20 , O2 SAT 
92 , Room Air, O2 Flow Rate 2.0 .  

Vital Sign Comment: []



Latest Krishnamurthy Fall Score: 95  

Fall Risk: High Risk 

Safety Measures: Call light Within Reach, Bed Alarm Zone 1, Side Rails Side Rails x3, Bed 
position Low and Locked.

Fall Precautions: 

Yellow Socks

Door Sign

Patient Fall Education

-------------------------------------------------------------------------------

Addendum: 11/03/20 at 0714 by POOL WILSON RN RN

-------------------------------------------------------------------------------

HAND-OFF: 

Report given to Alejandro.

## 2020-11-03 NOTE — INFECTIOUS DISEASES PROG NOTE
Assessment/Plan








Assessment/Plan:


69 yo female with PHMx of DM and Breast CA ( Status post resection) who 

presented to the ED after a fall. 





Pyuria


    UA  Increased LE and Mod Ryan seen; ucx MIXED UROGENITAL CONTAMINANTS


                                    COLONY COUNT: >100,000 CFU/ML


    Asymptomatic





sp Fall 


Low grade fever x 1


No Leukocytosis


  -11/2 CXR: Cardiomegaly. No acute process


 


DM


Breast CA ( Status post resection)


Right Ankle Fx  2019





PLAN


- Monitor off abx unless persistent febrile or high fever, leukocytosis and/or 

HD instability


- f/u Cultures


- Monitor CBC and Temps








Thank you for this consult. [facility] will continue to follow Ms. Morales while 

she hiospitalized.





Subjective


Allergies:  


Coded Allergies:  


     HYDROCODONE (Verified  Allergy, Unknown, 10/31/20)


     LORATADINE (Verified  Allergy, Unknown, 10/31/20)


   pt states not feeling well and having rashes after taking


   this medication at a rehab facility


Tm 100.3; afebrile in ~24hrs


no leukocytosis 


at 2l NC


Cr improving





Objective





Last 24 Hour Vital Signs








  Date Time  Temp Pulse Resp B/P (MAP) Pulse Ox O2 Delivery O2 Flow Rate FiO2


 


11/3/20 09:00      Room Air  


 


11/3/20 08:00 98.0 79 20 120/66 (84) 94   


 


11/3/20 04:00 98.1 84 20 127/61 (83) 92   


 


11/3/20 00:00 97.8 87 20 121/63 (82) 93   


 


11/2/20 21:00      Room Air  


 


11/2/20 20:00 97.7 78 20 118/59 (78) 93   


 


11/2/20 18:48 98.3       


 


11/2/20 16:00 98.3 78 18 114/70 (85) 96   


 


11/2/20 12:00 98.0 77 18 109/55 (73) 95   








Height (Feet):  5


Height (Inches):  2.00


Weight (Pounds):  209


GEN: NAD


HEENT: NCAT, MMM, EOMI, No Scleral icterus, 


Neck: No LAD, Supple


LUNGS: CTAB, No W


HEART: RRR, S1, S2. 


ABD: Soft, NT, Mild Distension


Skin: No rash,, Normal in color


Ext. Some pain with on ambulation right LE


NEURO: A/O x 4, No focal deficits





Microbiology








 Date/Time


Source Procedure


Growth Status





 


 11/1/20 17:45


Nasopharynx SARS-CoV-2 RdRp Gene Assay - Final Complete











Laboratory Tests








Test


 11/2/20


17:30 11/2/20


20:04 11/3/20


04:50 11/3/20


05:35


 


POC Whole Blood Glucose Pending   Pending    Pending  


 


White Blood Count


 


 


 5.6 K/UL


(4.8-10.8) 





 


Red Blood Count


 


 


 3.72 M/UL


(4.20-5.40)  L 





 


Hemoglobin


 


 


 10.3 G/DL


(12.0-16.0)  L 





 


Hematocrit


 


 


 33.5 %


(37.0-47.0)  L 





 


Mean Corpuscular Volume   90 FL (80-99)   


 


Mean Corpuscular Hemoglobin


 


 


 27.6 PG


(27.0-31.0) 





 


Mean Corpuscular Hemoglobin


Concent 


 


 30.6 G/DL


(32.0-36.0)  L 





 


Red Cell Distribution Width


 


 


 14.6 %


(11.6-14.8) 





 


Platelet Count


 


 


 186 K/UL


(150-450) 





 


Mean Platelet Volume


 


 


 6.8 FL


(6.5-10.1) 





 


Neutrophils (%) (Auto)


 


 


 57.4 %


(45.0-75.0) 





 


Lymphocytes (%) (Auto)


 


 


 23.2 %


(20.0-45.0) 





 


Monocytes (%) (Auto)


 


 


 9.4 %


(1.0-10.0) 





 


Eosinophils (%) (Auto)


 


 


 9.4 %


(0.0-3.0)  H 





 


Basophils (%) (Auto)


 


 


 0.7 %


(0.0-2.0) 





 


Sodium Level


 


 


 133 MMOL/L


(136-145)  L 





 


Potassium Level


 


 


 3.8 MMOL/L


(3.5-5.1) 





 


Chloride Level


 


 


 102 MMOL/L


() 





 


Carbon Dioxide Level


 


 


 24 MMOL/L


(21-32) 





 


Anion Gap


 


 


 7 mmol/L


(5-15) 





 


Blood Urea Nitrogen


 


 


 30 mg/dL


(7-18)  H 





 


Creatinine


 


 


 1.5 MG/DL


(0.55-1.30)  H 





 


Estimat Glomerular Filtration


Rate 


 


 34.4 mL/min


(>60) 





 


Glucose Level


 


 


 164 MG/DL


()  H 





 


Calcium Level


 


 


 7.9 MG/DL


(8.5-10.1)  L 














Current Medications








 Medications


  (Trade)  Dose


 Ordered  Sig/Lia


 Route


 PRN Reason  Start Time


 Stop Time Status Last Admin


Dose Admin


 


 Acetaminophen


  (Tylenol)  650 mg  Q6H  PRN


 ORAL


 TEMP >100.4  11/1/20 17:45


 12/1/20 17:44   





 


 Atorvastatin


 Calcium


  (Lipitor)  20 mg  BEDTIME


 ORAL


   10/31/20 21:00


 1/29/21 20:59  11/2/20 20:02





 


 Dextrose


  (Dextrose 50%)  25 ml  Q30M  PRN


 IV


 Hypoglycemia  10/31/20 11:45


 1/29/21 11:44   





 


 Dextrose


  (Dextrose 50%)  50 ml  Q30M  PRN


 IV


 Hypoglycemia  10/31/20 11:45


 1/29/21 11:44   





 


 Diphenhydramine


 HCl


  (Benadryl)  25 mg  Q6H  PRN


 ORAL


 Itching  11/2/20 18:30


 12/2/20 18:29  11/3/20 09:06





 


 Folic Acid


  (Folate)  1 mg  DAILY


 ORAL


   11/1/20 09:00


 12/1/20 08:59  11/3/20 09:04





 


 Heparin Sodium


  (Porcine)


  (Heparin 5000


 units/ml)  5,000 units  EVERY 12  HOURS


 SUBQ


   10/31/20 21:00


 12/15/20 20:59  11/3/20 09:05





 


 Ibuprofen


  (Motrin)  600 mg  Q8H  PRN


 ORAL


 For Pain  10/31/20 17:45


 11/30/20 17:44  11/2/20 21:12





 


 Insulin Aspart


  (NovoLOG)    BEFORE MEALS AND  HS


 SUBQ


   10/31/20 16:30


 1/29/21 16:29  11/3/20 05:42





 


 Insulin Detemir


  (Levemir)  38 units  BEDTIME


 SUBQ


   10/31/20 21:00


 1/29/21 20:59   





 


 Lidocaine


  (Lidoderm 5%


 PATCH)  1 patch  DAILY


 TDERMAL


   11/1/20 09:00


 1/30/21 08:59  11/3/20 09:04





 


 Menthol/Methyl


 Salicylate


  (Bengay)  1 applic  FOUR TIMES A  DAY


 TOPIC


   11/1/20 10:00


 12/1/20 09:59  11/3/20 09:04





 


 Ondansetron HCl


  (Zofran)  4 mg  Q4H  PRN


 IVP


 Nausea & Vomiting  10/31/20 23:00


 11/30/20 22:59  11/3/20 07:04





 


 Oxycodone/


 Acetaminophen


  (Percocet 5-325)  1 tab  Q6H  PRN


 ORAL


 pain  10/31/20 23:00


 11/7/20 22:59  11/3/20 01:30





 


 Pioglitazone HCl


  (Actos)  45 mg  DAILY


 ORAL


   11/1/20 09:00


 12/1/20 08:59  11/3/20 09:04





 


 Vitamin D


  (Vitamin D)  1,000 intlu  DAILY


 ORAL


   11/1/20 09:00


 12/1/20 08:59  11/3/20 09:07




















Radha Colin M.D.             Nov 3, 2020 11:58

## 2020-11-03 NOTE — CONSULTATION
DATE OF CONSULTATION:  10/31/2020

ORTHOPEDIC CONSULTATION



HISTORY OF PRESENT ILLNESS:  The patient is a pleasant 70-year-old female

who was in the bathroom where she kind of twisted and fell.  She had

difficulty ambulating.  She had a previous fracture in tibia, and

therefore presented to the ER.  She has some pain and discomfort.  Imaging

studies were obtained.  Orthopedic consultation was obtained for further

care and recommendation.



PAST MEDICAL HISTORY:  Per intake chart.



SURGICAL HISTORY:  Per intake chart.



MEDICATIONS:  Per intake chart.



PHYSICAL EXAMINATION:  Right leg examination shows some _____ the ankle.

Dorsalis pedis +2.  Neurovascular exam is normal.  Posterior calf is soft.

 



IMAGING STUDY:  Three views of the right ankle were reviewed and showed no

fracture, dislocation, or soft-tissue abnormalities.



ASSESSMENT:  Right ankle sprain.



DISCUSSION:  At this point, it looks like we can treat her conservatively.

She is going to be admitted for discharge planning to make sure she can

safely go home.  We will start her on some physical therapy, weightbearing

as tolerated.  I recommend cryotherapy as well as elevation in the

meantime to decrease the swelling and pain.









  ______________________________________________

  Flo Rivera M.D.





DR:  KATELYN

D:  11/02/2020 18:42

T:  11/02/2020 23:49

JOB#:  [id number]

CC:

## 2020-11-03 NOTE — NUR
NURSE NOTES:

Received report from Kerrie RN, pt sleeping, laying in bed with no signs of distress or other 
issues at this time. per night shift, no over night events. IV on the right hand gauge #22 
running  IVF @60ml/hr.  call light within reach, bed in lowest position. side rales up x2, I 
will f/u as needed.

## 2020-11-03 NOTE — GENERAL PROGRESS NOTE
Subjective


Constitutional:  Reports: weakness


Allergies:  


Coded Allergies:  


     HYDROCODONE (Verified  Allergy, Unknown, 10/31/20)


     LORATADINE (Verified  Allergy, Unknown, 10/31/20)


   pt states not feeling well and having rashes after taking


   this medication at a rehab facility


All Systems:  reviewed and negative except above


Subjective


sl general pain





Objective





Last 24 Hour Vital Signs








  Date Time  Temp Pulse Resp B/P (MAP) Pulse Ox O2 Delivery O2 Flow Rate FiO2


 


11/3/20 09:00      Room Air  


 


11/3/20 08:00 98.0 79 20 120/66 (84) 94   


 


11/3/20 04:00 98.1 84 20 127/61 (83) 92   


 


11/3/20 00:00 97.8 87 20 121/63 (82) 93   


 


11/2/20 21:00      Room Air  


 


11/2/20 20:00 97.7 78 20 118/59 (78) 93   


 


11/2/20 18:48 98.3       


 


11/2/20 16:00 98.3 78 18 114/70 (85) 96   

















Intake and Output  


 


 11/2/20 11/3/20





 19:00 07:00


 


Intake Total 560 ml 900 ml


 


Output Total 1500 ml 320 ml


 


Balance -940 ml 580 ml


 


  


 


Intake Oral 500 ml 240 ml


 


IV Total 60 ml 660 ml


 


Output Urine Total 1500 ml 320 ml


 


# Voids 4 








Laboratory Tests


11/2/20 17:30: POC Whole Blood Glucose [Pending]


11/2/20 20:04: POC Whole Blood Glucose [Pending]


11/3/20 04:50: 


White Blood Count 5.6, Red Blood Count 3.72L, Hemoglobin 10.3L, Hematocrit 33.5L

, Mean Corpuscular Volume 90, Mean Corpuscular Hemoglobin 27.6, Mean Corpuscular

 Hemoglobin Concent 30.6L, Red Cell Distribution Width 14.6, Platelet Count 186,

 Mean Platelet Volume 6.8, Neutrophils (%) (Auto) 57.4, Lymphocytes (%) (Auto) 

23.2, Monocytes (%) (Auto) 9.4, Eosinophils (%) (Auto) 9.4H, Basophils (%) 

(Auto) 0.7, Sodium Level 133L, Potassium Level 3.8, Chloride Level 102, Carbon 

Dioxide Level 24, Anion Gap 7, Blood Urea Nitrogen 30H, Creatinine 1.5H, Estimat

 Glomerular Filtration Rate 34.4, Glucose Level 164H, Calcium Level 7.9L


11/3/20 05:35: POC Whole Blood Glucose [Pending]


11/3/20 12:06: POC Whole Blood Glucose [Pending]


Height (Feet):  5


Height (Inches):  2.00


Weight (Pounds):  209


General Appearance:  lethargic


EENT:  normal ENT inspection


Neck:  normal alignment


Cardiovascular:  normal peripheral pulses, normal rate, regular rhythm


Respiratory/Chest:  chest wall non-tender, lungs clear, normal breath sounds


Abdomen:  normal bowel sounds, non tender, soft


Extremities:  normal inspection


Edema:  no edema noted Arm (L), no edema noted Arm (R), no edema noted Leg (L), 

no edema noted Leg (R), no edema noted Pedal (L), no edema noted Pedal (R), no 

edema noted Generalized


Neurologic:  responsive, motor weakness


Skin:  normal pigmentation, warm/dry





Assessment/Plan


Problem List:  


(1) Diabetes


ICD Codes:  E11.9 - Type 2 diabetes mellitus without complications


SNOMED:  81309831, 302014898, [id number]


Qualifiers:  


   Qualified Codes:  E11.9 - Type 2 diabetes mellitus without complications; 

Z79.4 - Long term (current) use of insulin


(2) Renal insufficiency


ICD Codes:  N28.9 - Disorder of kidney and ureter, unspecified


SNOMED:  862295206, 463199135


(3) Fall


ICD Codes:  W19.XXXA - Unspecified fall, initial encounter


SNOMED:  9509943, 760374555, [id number]


Qualifiers:  


   Qualified Codes:  W19.XXXA - Unspecified fall, initial encounter


(4) Unable to ambulate


ICD Codes:  R26.2 - Difficulty in walking, not elsewhere classified


SNOMED:  774713661, 804902723, [id number]


(5) Sprain of right lower leg


ICD Codes:  S83.91XA - Sprain of unspecified site of right knee, initial 

encounter


SNOMED:  072816400, 070769344, [id number]


Qualifiers:  


   Qualified Codes:  S83.91XA - Sprain of unspecified site of right knee, 

initial encounter


Status:  unchanged


Assessment/Plan:


pt diet pain control abx cbc bmp am aru Dominguez Gerard DO         Nov 3, 2020 12:30

## 2020-11-03 NOTE — GENERAL PROGRESS NOTE
Subjective


Date patient seen:  Nov 3, 2020


Time patient seen:  07:15 - am


Allergies:  


Coded Allergies:  


     HYDROCODONE (Verified  Allergy, Unknown, 10/31/20)


     LORATADINE (Verified  Allergy, Unknown, 10/31/20)


   pt states not feeling well and having rashes after taking


   this medication at a rehab facility


Subjective


HISTORY OF PRESENT ILLNESS:  This is a 70-year-old female who is being seen


on the Med/Surg floor of [facility]. Patient is in bed continues


to c/o pain which has been severe with ambulation, orthopedic surgeon has


recommended boot which patient is looking forward to. No new complaints


at this time. 








REVIEW OF SYSTEMS:  Denies rash, fever, chills, sweating, dizziness,


drowsiness, blurred vision, sore throat, change in her weight.  No


shortness of breath or chest pain.  No nausea, vomiting, diarrhea, or


blood in the stool or urine.  No dysuria.





Objective





Last 24 Hour Vital Signs








  Date Time  Temp Pulse Resp B/P (MAP) Pulse Ox O2 Delivery O2 Flow Rate FiO2


 


11/3/20 08:00 98.0 79 20 120/66 (84) 94   


 


11/3/20 04:00 98.1 84 20 127/61 (83) 92   


 


11/3/20 00:00 97.8 87 20 121/63 (82) 93   


 


11/2/20 21:00      Room Air  


 


11/2/20 20:00 97.7 78 20 118/59 (78) 93   


 


11/2/20 18:48 98.3       


 


11/2/20 16:00 98.3 78 18 114/70 (85) 96   


 


11/2/20 12:00 98.0 77 18 109/55 (73) 95   


 


11/2/20 10:06 97.8       

















Intake and Output  


 


 11/2/20 11/3/20





 19:00 07:00


 


Intake Total 560 ml 900 ml


 


Output Total 1500 ml 320 ml


 


Balance -940 ml 580 ml


 


  


 


Intake Oral 500 ml 240 ml


 


IV Total 60 ml 660 ml


 


Output Urine Total 1500 ml 320 ml


 


# Voids 4 








Laboratory Tests


11/2/20 10:50: 


Sodium Level 137, Potassium Level 4.0, Chloride Level 103, Carbon Dioxide Level 

27, Anion Gap 7, Blood Urea Nitrogen 28H, Creatinine 1.3, Estimat Glomerular 

Filtration Rate 40.6, Glucose Level 168H, Calcium Level 8.4L


11/2/20 11:50: POC Whole Blood Glucose 159H


11/2/20 17:30: POC Whole Blood Glucose [Pending]


11/2/20 20:04: POC Whole Blood Glucose [Pending]


11/3/20 04:50: 


White Blood Count 5.6, Red Blood Count 3.72L, Hemoglobin 10.3L, Hematocrit 33.5L

, Mean Corpuscular Volume 90, Mean Corpuscular Hemoglobin 27.6, Mean Corpuscular

 Hemoglobin Concent 30.6L, Red Cell Distribution Width 14.6, Platelet Count 186,

 Mean Platelet Volume 6.8, Neutrophils (%) (Auto) 57.4, Lymphocytes (%) (Auto) 

23.2, Monocytes (%) (Auto) 9.4, Eosinophils (%) (Auto) 9.4H, Basophils (%) (

Auto) 0.7, Sodium Level 133L, Potassium Level 3.8, Chloride Level 102, Carbon 

Dioxide Level 24, Anion Gap 7, Blood Urea Nitrogen 30H, Creatinine 1.5H, Estimat

 Glomerular Filtration Rate 34.4, Glucose Level 164H, Calcium Level 7.9L


11/3/20 05:35: POC Whole Blood Glucose [Pending]


Height (Feet):  5


Height (Inches):  2.00


Weight (Pounds):  209


Objective


PHYSICAL EXAMINATION:


GENERAL:  Alert, awake, and oriented.


LUNGS:  Decreased breath sounds bilaterally.


HEART:  S1S2 Regular.


ABDOMEN:  Obese


EXTREMITIES:  with tenderness to palpation of the right ankle 


decreased range of motion, slight swelling noted.


NEURO: No changes





Assessment/Plan


Assessment/Plan:


(1) Right ankle pain


(2) Right ankle sprain s/p fall


Patient to be continued on Percocet 


D/w Dr. Peña and he concurred.











Alberto Francis              Nov 3, 2020 09:03

## 2020-11-03 NOTE — NUR
*-*DISCHARGE PLANNING*-*



PATIENT HAS BEEN REFERRED TO:



[facility]

P: [phone]

S/W ADELINA, WILL CALL BACK AFTER REVIEW.

## 2020-11-03 NOTE — NUR
***INSURANCE***



CLINICALS FAXED TO [facility] 



 [phone]

 [phone]



 [phone]

 [phone]

## 2020-11-03 NOTE — NUR
NURSE HAND-OFF: 



Important Events on Shift: pt able to walk around her room with PT and the use of a FWW

Patient Status: stable/ full code

Diet: CCHO medium diet



Pending Orders: MRI thoracic spine

Pending Results/Labs: MRI right foot (pending results)

Pending MD notification: results for MRI, pls notify Dr. Rivera



Latest Vital Signs: Temperature 97.7 , Pulse 94 , B/P 145 /73 , Respiratory Rate 20 , O2 SAT 
96 , Room Air, O2 Flow Rate 2.0 .  

Vital Sign Comment: stable



Latest Krishnamurthy Fall Score: 95  

Fall Risk: High Risk 

Safety Measures: Call light Within Reach, Bed Alarm Zone 1, Side Rails Side Rails x3, Bed 
position Low and Locked.

Fall Precautions: 

Yellow Socks

Door Sign

Patient Fall Education



Report given to Luma ERICKSON, pt in stable condition. with no signs of distress or other issues 
at this time.

- Pending results of MRI right foot due to technical difficulties. 

- pending MRI thoracic spine ordered by Dr. Mac however pt stated that doesn't want to 
have done the MRI. but will confirm in the morning, incoming nurse is aware.

## 2020-11-03 NOTE — NUR
NURSE NOTES:

called Case management to inform that order was placed for DME:Ankle lace up. per Aracelis, 
the orthopedic company will assess the patient tomorrow morning. she is also aware that pt 
would like to go home instead of a SNF upon d/c. per PT patient will benefit from HH for 
RN/PT safety eval and tx, as well as from a BSC and FWW. will f/u as needed.

## 2020-11-03 NOTE — NUR
*-*DISCHARGE PLANNING*-*



S/W KOLE @ [facility] P: [phone] STATED SOMEONE WILL BE THER TO DROP OFF EQUIPMENT 
AROUND 4:15PM.

PER NURSE DR. LEIGHA HURT WANTS ANKLE LACE UP.

## 2020-11-03 NOTE — NUR
NURSE NOTES:

patient off the unit for  MRI right foot. pt left the floor with no signs of distress or 
other issues. I will f/u as needed.

## 2020-11-04 VITALS — DIASTOLIC BLOOD PRESSURE: 68 MMHG | SYSTOLIC BLOOD PRESSURE: 132 MMHG

## 2020-11-04 VITALS — DIASTOLIC BLOOD PRESSURE: 66 MMHG | SYSTOLIC BLOOD PRESSURE: 129 MMHG

## 2020-11-04 VITALS — SYSTOLIC BLOOD PRESSURE: 133 MMHG | DIASTOLIC BLOOD PRESSURE: 65 MMHG

## 2020-11-04 VITALS — SYSTOLIC BLOOD PRESSURE: 125 MMHG | DIASTOLIC BLOOD PRESSURE: 69 MMHG

## 2020-11-04 VITALS — SYSTOLIC BLOOD PRESSURE: 129 MMHG | DIASTOLIC BLOOD PRESSURE: 66 MMHG

## 2020-11-04 VITALS — SYSTOLIC BLOOD PRESSURE: 128 MMHG | DIASTOLIC BLOOD PRESSURE: 65 MMHG

## 2020-11-04 LAB
% IRON SATURATION: 11 % (ref 15–50)
ADD MANUAL DIFF: NO
ANION GAP SERPL CALC-SCNC: 7 MMOL/L (ref 5–15)
BASOPHILS NFR BLD AUTO: 1.1 % (ref 0–2)
BUN SERPL-MCNC: 35 MG/DL (ref 7–18)
CALCIUM SERPL-MCNC: 8.7 MG/DL (ref 8.5–10.1)
CHLORIDE SERPL-SCNC: 106 MMOL/L (ref 98–107)
CO2 SERPL-SCNC: 26 MMOL/L (ref 21–32)
CREAT SERPL-MCNC: 1.6 MG/DL (ref 0.55–1.3)
EOSINOPHIL NFR BLD AUTO: 10.5 % (ref 0–3)
ERYTHROCYTE [DISTWIDTH] IN BLOOD BY AUTOMATED COUNT: 14.4 % (ref 11.6–14.8)
FERRITIN SERPL-MCNC: 125 NG/ML (ref 8–388)
HCT VFR BLD CALC: 34.6 % (ref 37–47)
HGB BLD-MCNC: 10.6 G/DL (ref 12–16)
IRON SERPL-MCNC: 36 UG/DL (ref 50–175)
LYMPHOCYTES NFR BLD AUTO: 29 % (ref 20–45)
MCV RBC AUTO: 90 FL (ref 80–99)
MONOCYTES NFR BLD AUTO: 8.1 % (ref 1–10)
NEUTROPHILS NFR BLD AUTO: 51.2 % (ref 45–75)
PLATELET # BLD: 207 K/UL (ref 150–450)
POTASSIUM SERPL-SCNC: 4.3 MMOL/L (ref 3.5–5.1)
RBC # BLD AUTO: 3.85 M/UL (ref 4.2–5.4)
SODIUM SERPL-SCNC: 138 MMOL/L (ref 136–145)
TIBC SERPL-MCNC: 335 UG/DL (ref 250–450)
UNSATURATED IRON BINDING: 299 UG/DL (ref 112–346)
WBC # BLD AUTO: 5 K/UL (ref 4.8–10.8)

## 2020-11-04 RX ADMIN — HEPARIN SODIUM SCH UNITS: 5000 INJECTION INTRAVENOUS; SUBCUTANEOUS at 09:00

## 2020-11-04 RX ADMIN — ANALGESIC BALM SCH APPLIC: 1.74; 4.06 OINTMENT TOPICAL at 08:59

## 2020-11-04 RX ADMIN — ANALGESIC BALM SCH APPLIC: 1.74; 4.06 OINTMENT TOPICAL at 13:07

## 2020-11-04 RX ADMIN — INSULIN ASPART SCH UNITS: 100 INJECTION, SOLUTION INTRAVENOUS; SUBCUTANEOUS at 11:30

## 2020-11-04 RX ADMIN — DOCUSATE SODIUM SCH MG: 100 CAPSULE, LIQUID FILLED ORAL at 08:54

## 2020-11-04 RX ADMIN — INSULIN ASPART SCH UNITS: 100 INJECTION, SOLUTION INTRAVENOUS; SUBCUTANEOUS at 06:06

## 2020-11-04 RX ADMIN — VITAMIN D, TAB 1000IU (100/BT) SCH INTLU: 25 TAB at 08:55

## 2020-11-04 RX ADMIN — ANALGESIC BALM SCH APPLIC: 1.74; 4.06 OINTMENT TOPICAL at 17:20

## 2020-11-04 RX ADMIN — INSULIN ASPART SCH UNITS: 100 INJECTION, SOLUTION INTRAVENOUS; SUBCUTANEOUS at 17:21

## 2020-11-04 RX ADMIN — DOCUSATE SODIUM SCH MG: 100 CAPSULE, LIQUID FILLED ORAL at 17:20

## 2020-11-04 NOTE — NUR
*-*DISCHARGE PLANNING*-*



PATIENT HAS BEEN ACCEPTED WITH



[facility]

P [phone]

S/W KRISTI, WILL SERVICE PATIENT UPON DISCHARGE

## 2020-11-04 NOTE — NUR
NURSE NOTES:

Pt made aware that she's started on stool softeners today. Per pt, she'll try & pass BM 
maybe after breakfast or so since she's tired & will ask for suppository if needed.

## 2020-11-04 NOTE — NUR
NOTE



S/W PATIENT AT BEDSIDE IN RE TO RECOMMENDATION FOR ARU. PATIENT STATED "ABSOLUTELY NOT". PER 
PATIENT, SHE WANTS TO RETURN HOME. STATED SHE WILL HAVE FULL TIME CAREGIVER THROUGH HER 
CAREGIVING AGENCY. A CAREGIVER WILL COME PICK HER UP FROM [facility] UPON DC.

## 2020-11-04 NOTE — GENERAL PROGRESS NOTE
Subjective


Date patient seen:  Nov 4, 2020


Time patient seen:  07:00 - am


Allergies:  


Coded Allergies:  


     HYDROCODONE (Verified  Allergy, Unknown, 10/31/20)


     LORATADINE (Verified  Allergy, Unknown, 10/31/20)


   pt states not feeling well and having rashes after taking


   this medication at a rehab facility


Subjective


HISTORY OF PRESENT ILLNESS:  This is a 70-year-old female who is being seen


on the Med/Surg floor of [facility]. Patient is in bed and continues


to c/o pain which has been tolerated on the Percocet. MRI of foot was performed


not ankle. D/w nurse. 








REVIEW OF SYSTEMS:  Denies rash, fever, chills, sweating, dizziness,


drowsiness, blurred vision, sore throat, change in her weight.  No


shortness of breath or chest pain.  No nausea, vomiting, diarrhea, or


blood in the stool or urine.  No dysuria.





Objective





Last 24 Hour Vital Signs








  Date Time  Temp Pulse Resp B/P (MAP) Pulse Ox O2 Delivery O2 Flow Rate FiO2


 


11/4/20 08:15     95 Nasal Cannula 2.0 28


 


11/4/20 07:27 98.0       


 


11/4/20 04:01 98.0 81 16 125/69 (87) 96   


 


11/4/20 00:00 97.7 82 16 128/65 (86) 97   


 


11/3/20 21:00      Room Air  


 


11/3/20 20:00 97.9 88 18 137/63 (87) 97   


 


11/3/20 16:55 97.7       


 


11/3/20 16:00 98.4 94 20 145/73 (97) 96   


 


11/3/20 13:53 97.7       


 


11/3/20 12:00 97.7 82 20 109/66 (80) 94   


 


11/3/20 09:00      Room Air  

















Intake and Output  


 


 11/3/20 11/4/20





 19:00 07:00


 


Intake Total 550 ml 360 ml


 


Output Total 600 ml 700 ml


 


Balance -50 ml -340 ml


 


  


 


Intake Oral 550 ml 360 ml


 


Output Urine Total 600 ml 700 ml


 


# Voids  3








Laboratory Tests


11/3/20 12:06: POC Whole Blood Glucose [Pending]


11/3/20 16:23: POC Whole Blood Glucose [Pending]


11/3/20 20:49: POC Whole Blood Glucose 182H


11/4/20 05:11: 


White Blood Count 5.0, Red Blood Count 3.85L, Hemoglobin 10.6L, Hematocrit 34.6L

, Mean Corpuscular Volume 90, Mean Corpuscular Hemoglobin 27.4, Mean Corpuscular

 Hemoglobin Concent 30.5L, Red Cell Distribution Width 14.4, Platelet Count 207,

 Mean Platelet Volume 6.4L, Neutrophils (%) (Auto) 51.2, Lymphocytes (%) (Auto) 

29.0, Monocytes (%) (Auto) 8.1, Eosinophils (%) (Auto) 10.5H, Basophils (%) 

(Auto) 1.1, Sodium Level 138, Potassium Level 4.3, Chloride Level 106, Carbon 

Dioxide Level 26, Anion Gap 7, Blood Urea Nitrogen 35H, Creatinine 1.6H, Estimat

 Glomerular Filtration Rate 32.0, Glucose Level 148H, Calcium Level 8.7


11/4/20 05:16: POC Whole Blood Glucose 174H


Height (Feet):  5


Height (Inches):  2.00


Weight (Pounds):  209


Objective


PHYSICAL EXAMINATION:


GENERAL:  Alert, awake, and oriented.


LUNGS:  Decreased breath sounds bilaterally.


HEART:  S1S2 Regular.


ABDOMEN:  Obese


EXTREMITIES:  with tenderness to palpation of the right ankle 


decreased range of motion, slight swelling noted.


NEURO: No changes





Assessment/Plan


Assessment/Plan:


(1) Right ankle pain


(2) Right ankle sprain s/p fall


Patient to be continued on Percocet 


D/w Dr. Peña and he concurred.











Alberto Francis              Nov 4, 2020 08:36

## 2020-11-04 NOTE — NUR
NURSE NOTES:



Received report from DRE Ge. Rounding was done with night shift nurse. Patient a/o x 4, in 
bed, having breakfast. No SOB noted. Pt c/o right leg pain as 7/10. Motrin was given by 
night shift nurse. Bed in lowest position, call light within reach. Will continue to 
monitor.

## 2020-11-04 NOTE — CONSULTATION
History of Present Illness


General


Chief Complaint:  Pain


Referring physician:  


Reason for Consultation:  





Present Illness


Allergies:  


Coded Allergies:  


     HYDROCODONE (Verified  Allergy, Unknown, 10/31/20)


     LORATADINE (Verified  Allergy, Unknown, 10/31/20)


   pt states not feeling well and having rashes after taking


   this medication at a rehab facility





Medication History


Scheduled


Cholecalciferol (Vitamin D3) (Vitamin D3*), Unknown Dose PO DAILY, (Reported)


Folic Acid* (Folic Acid*), 1 MG ORAL DAILY, (Reported)


Insulin Glargine (Lantus), 38 SUBQ BEDTIME, (Reported)


Pioglitazone Hcl* (Actos*), 45 MG ORAL DAILY, (Reported)


Rosuvastatin Calcium* (Crestor*), 10 MG ORAL DAILY, (Reported)





Miscellaneous Medications


[ozempic], Unknown Dose, (Reported)





Discontinued Medications


Semaglutide (Rybelsus), 0.25 MG PO ONCE A WEEK, (Reported)


   Discontinued Reason: Pt stopped taking med





Patient History


Healthcare decision maker





Resuscitation status





Advanced Directive on File








Physical Exam





Last 24 Hour Vital Signs








  Date Time  Temp Pulse Resp B/P (MAP) Pulse Ox O2 Delivery O2 Flow Rate FiO2


 


11/4/20 09:00      Room Air  


 


11/4/20 08:15     95 Nasal Cannula 2.0 28


 


11/4/20 08:00 98.0 76 16 132/68 (89) 98   


 


11/4/20 07:27 98.0       


 


11/4/20 04:01 98.0 81 16 125/69 (87) 96   


 


11/4/20 00:00 97.7 82 16 128/65 (86) 97   


 


11/3/20 21:00      Room Air  


 


11/3/20 20:00 97.9 88 18 137/63 (87) 97   


 


11/3/20 16:55 97.7       


 


11/3/20 16:00 98.4 94 20 145/73 (97) 96   


 


11/3/20 13:53 97.7       

















Intake and Output  


 


 11/3/20 11/4/20





 19:00 07:00


 


Intake Total 550 ml 360 ml


 


Output Total 600 ml 700 ml


 


Balance -50 ml -340 ml


 


  


 


Intake Oral 550 ml 360 ml


 


Output Urine Total 600 ml 700 ml


 


# Voids  3











Laboratory Tests








Test


 11/3/20


16:23 11/3/20


20:49 11/4/20


05:11 11/4/20


05:16


 


POC Whole Blood Glucose


 Pending  


 182 MG/DL


()  H 


 174 MG/DL


()  H


 


White Blood Count


 


 


 5.0 K/UL


(4.8-10.8) 





 


Red Blood Count


 


 


 3.85 M/UL


(4.20-5.40)  L 





 


Hemoglobin


 


 


 10.6 G/DL


(12.0-16.0)  L 





 


Hematocrit


 


 


 34.6 %


(37.0-47.0)  L 





 


Mean Corpuscular Volume   90 FL (80-99)   


 


Mean Corpuscular Hemoglobin


 


 


 27.4 PG


(27.0-31.0) 





 


Mean Corpuscular Hemoglobin


Concent 


 


 30.5 G/DL


(32.0-36.0)  L 





 


Red Cell Distribution Width


 


 


 14.4 %


(11.6-14.8) 





 


Platelet Count


 


 


 207 K/UL


(150-450) 





 


Mean Platelet Volume


 


 


 6.4 FL


(6.5-10.1)  L 





 


Neutrophils (%) (Auto)


 


 


 51.2 %


(45.0-75.0) 





 


Lymphocytes (%) (Auto)


 


 


 29.0 %


(20.0-45.0) 





 


Monocytes (%) (Auto)


 


 


 8.1 %


(1.0-10.0) 





 


Eosinophils (%) (Auto)


 


 


 10.5 %


(0.0-3.0)  H 





 


Basophils (%) (Auto)


 


 


 1.1 %


(0.0-2.0) 





 


Sodium Level


 


 


 138 MMOL/L


(136-145) 





 


Potassium Level


 


 


 4.3 MMOL/L


(3.5-5.1) 





 


Chloride Level


 


 


 106 MMOL/L


() 





 


Carbon Dioxide Level


 


 


 26 MMOL/L


(21-32) 





 


Anion Gap


 


 


 7 mmol/L


(5-15) 





 


Blood Urea Nitrogen


 


 


 35 mg/dL


(7-18)  H 





 


Creatinine


 


 


 1.6 MG/DL


(0.55-1.30)  H 





 


Estimat Glomerular Filtration


Rate 


 


 32.0 mL/min


(>60) 





 


Glucose Level


 


 


 148 MG/DL


()  H 





 


Calcium Level


 


 


 8.7 MG/DL


(8.5-10.1) 





 


Test


 11/4/20


11:35 


 


 





 


POC Whole Blood Glucose Pending     








Height (Feet):  5


Height (Inches):  2.00


Weight (Pounds):  209


Medications





Current Medications








 Medications


  (Trade)  Dose


 Ordered  Sig/Lia


 Route


 PRN Reason  Start Time


 Stop Time Status Last Admin


Dose Admin


 


 Acetaminophen


  (Tylenol)  650 mg  Q6H  PRN


 ORAL


 TEMP >100.4  11/1/20 17:45


 12/1/20 17:44   





 


 Atorvastatin


 Calcium


  (Lipitor)  20 mg  BEDTIME


 ORAL


   10/31/20 21:00


 1/29/21 20:59  11/3/20 20:56





 


 Bisacodyl


  (Dulcolax)  10 mg  BIDPRN  PRN


 RECTAL


 Constipation  11/4/20 10:15


 2/2/21 05:44   





 


 Dextrose


  (Dextrose 50%)  25 ml  Q30M  PRN


 IV


 Hypoglycemia  10/31/20 11:45


 1/29/21 11:44   





 


 Dextrose


  (Dextrose 50%)  50 ml  Q30M  PRN


 IV


 Hypoglycemia  10/31/20 11:45


 1/29/21 11:44   





 


 Diphenhydramine


 HCl


  (Benadryl)  25 mg  Q6H  PRN


 ORAL


 Itching  11/2/20 18:30


 12/2/20 18:29  11/4/20 05:01





 


 Docusate Sodium


  (Colace)  100 mg  TWICE A  DAY


 ORAL


   11/4/20 09:00


 12/4/20 08:59  11/4/20 08:54





 


 Folic Acid


  (Folate)  1 mg  DAILY


 ORAL


   11/1/20 09:00


 12/1/20 08:59  11/4/20 08:55





 


 Gadobutrol


  (Gadavist)  7.5 mmol  NOW  PRN


 IV


 Radiology Procedure  11/3/20 14:30


 11/7/20 14:29   





 


 Heparin Sodium


  (Porcine)


  (Heparin 5000


 units/ml)  5,000 units  EVERY 12  HOURS


 SUBQ


   10/31/20 21:00


 12/15/20 20:59  11/4/20 09:00





 


 Ibuprofen


  (Motrin)  600 mg  Q8H  PRN


 ORAL


 For Pain  10/31/20 17:45


 11/30/20 17:44  11/4/20 06:57





 


 Insulin Aspart


  (NovoLOG)    BEFORE MEALS AND  HS


 SUBQ


   10/31/20 16:30


 1/29/21 16:29  11/4/20 06:06





 


 Insulin Detemir


  (Levemir)  38 units  BEDTIME


 SUBQ


   10/31/20 21:00


 1/29/21 20:59   





 


 Lidocaine


  (Lidoderm 5%


 PATCH)  1 patch  DAILY


 TDERMAL


   11/1/20 09:00


 1/30/21 08:59  11/4/20 08:59





 


 Magnesium


 Hydroxide


  (Mom)  30 ml  TIDPRN  PRN


 ORAL


 Constipation  11/4/20 10:00


 12/4/20 09:59  11/4/20 10:22





 


 Menthol/Methyl


 Salicylate


  (Bengay)  1 applic  FOUR TIMES A  DAY


 TOPIC


   11/1/20 10:00


 12/1/20 09:59  11/4/20 08:59





 


 Ondansetron HCl


  (Zofran)  4 mg  Q4H  PRN


 IVP


 Nausea & Vomiting  10/31/20 23:00


 11/30/20 22:59  11/3/20 07:04





 


 Oxycodone/


 Acetaminophen


  (Percocet 5-325)  1 tab  Q6H  PRN


 ORAL


 pain  10/31/20 23:00


 11/7/20 22:59  11/3/20 20:56





 


 Pioglitazone HCl


  (Actos)  45 mg  DAILY


 ORAL


   11/1/20 09:00


 12/1/20 08:59  11/4/20 08:55





 


 Sodium Phosphate


  (Fleet's Sodium


 Phosl Enema)  133 ml  BIDPRN  PRN


 RECTAL


 Constipation  11/4/20 10:15


 12/4/20 05:44   





 


 Vitamin D


  (Vitamin D)  1,000 intlu  DAILY


 ORAL


   11/1/20 09:00


 12/1/20 08:59  11/4/20 08:55














Assessment/Plan


Assessment/Plan:


Heme consultation note





REQ MD: JUAREZ Haque


[facility] Anemia ongoing


DOS 11/4/2020





ID


70y old male recently moved into an apartment.  She was trying to urinate 

hovering over the toilet and slipped.  She Laurent she twisted her ankle or fell

on her leg twisting it she is unable to walk because of pain.  She fractured her

tibia in 2019.  She was in rehab after that and healed completely.  She denies 

hitting her head or loss of consciousness.  There is no hip or back pain.  She 

denies numbness.  This happened just before calling 911 and being transported by

[facility].  She reports the pain 9/10 at this time, aching.  It is better when she is 

not trying to weight-bear or ambulate -which she cannot do.





The patient is a type II diabetic but this is taking insulin.  She denies 

polyuria or polydipsia.





No fevers, chills, sore throat, chest pain, palpitations, nausea, vomiting, 

diarrhea, dysuria, abdominal pain, shortness of breath, rashes, depression, 

anxiety, visual changes, dizziness, headache.





Allergies:  


     ACETAMINOPHEN (Verified  Allergy, Unknown, 10/31/20)


     HYDROCODONE (Verified  Allergy, Unknown, 10/31/20)





COVID-19 Screening


Contact w/high risk pt:  No


Experienced COVID-19 symptoms?:  No


COVID-19 Testing performed PTA:  No





Past Medical History:  see triage record, DM


Past Surgical History:  hysterectomy, other - tibia fracture 2019, L breast 

lumpectomy


Social History:  Denies: smoking, alcohol use, drug use


Social History Narrative


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


Last Menstrual Period:  UNK


Pregnant Now:  No


Reviewed Nursing Documentation:  PMH: Agreed; PSxH: Agreed





Nursing Documentation-PMH


Past Medical History:  No History, Except For


Hx Diabetes:  Yes - DM II


Hx Cancer:  Yes - L SIDED BREAST CANCER LUMPECTOMY





Review of Systems


All Other Systems:  negative except mentioned in HPI





Physical Exam:


Vitals: reviewed


General:  NAD


HEENT:  nc, at


Neck: supple


Chest: clear breath sounds bilaterally


Cardiovascular:  RRR, no s3, s4


Abdomen:  soft, nontender, nd


Extremities: no cce, normal range of motion


Neuro: alert and oriented





Assessment and Recs


# Anemia of chronic disease due to underlying chronic medical issues, 

multifactorial v Gi bleed 


--> Anemia workup has been ordered, rule out gi bleed 


--> No evidence of hemolysis is noted, peripheral smear has been reviewed.


--> Hgb goal >7. Transfuse prn.


--> Epogen or iron at this time is not particularly indicated


--> Medications have been reviewed


--> low threshold for gi evaluation in case has occult +


--> bone marrow biopsy is not indicated given the other more likely causes


# Breast CA ( Status post resection) 


--> with chemo and xrt as needed


# DM2, ongoing


--> hgb a1c goal <8


--> bs checks and insluin sliding scale coverage


# Pyuria


--> UA  Increased LE and Mod Bac seen; ucx MIXED UROGENITAL CONTAMINANTS, COLONY

COUNT: >100,000 CFU/ML


--> off abx


# sp Fall 


# R foot pain, MRI R foot: fx 2019, mri noted





The timing of this note does not necessarily reflect the time of the patient was

seen.





Greatly appreciate consultation.











Chester Ware MD           Nov 4, 2020 12:15

## 2020-11-04 NOTE — GENERAL PROGRESS NOTE
Subjective


Constitutional:  Reports: weakness


Allergies:  


Coded Allergies:  


     HYDROCODONE (Verified  Allergy, Unknown, 10/31/20)


     LORATADINE (Verified  Allergy, Unknown, 10/31/20)


   pt states not feeling well and having rashes after taking


   this medication at a rehab facility


All Systems:  reviewed and negative except above


Subjective


sl general pain





Objective





Last 24 Hour Vital Signs








  Date Time  Temp Pulse Resp B/P (MAP) Pulse Ox O2 Delivery O2 Flow Rate FiO2


 


11/4/20 08:15     95 Nasal Cannula 2.0 28


 


11/4/20 07:27 98.0       


 


11/4/20 04:01 98.0 81 16 125/69 (87) 96   


 


11/4/20 00:00 97.7 82 16 128/65 (86) 97   


 


11/3/20 21:00      Room Air  


 


11/3/20 20:00 97.9 88 18 137/63 (87) 97   


 


11/3/20 16:55 97.7       


 


11/3/20 16:00 98.4 94 20 145/73 (97) 96   


 


11/3/20 13:53 97.7       


 


11/3/20 12:00 97.7 82 20 109/66 (80) 94   

















Intake and Output  


 


 11/3/20 11/4/20





 19:00 07:00


 


Intake Total 550 ml 360 ml


 


Output Total 600 ml 700 ml


 


Balance -50 ml -340 ml


 


  


 


Intake Oral 550 ml 360 ml


 


Output Urine Total 600 ml 700 ml


 


# Voids  3








Laboratory Tests


11/3/20 12:06: POC Whole Blood Glucose [Pending]


11/3/20 16:23: POC Whole Blood Glucose [Pending]


11/3/20 20:49: POC Whole Blood Glucose 182H


11/4/20 05:11: 


White Blood Count 5.0, Red Blood Count 3.85L, Hemoglobin 10.6L, Hematocrit 34.6L

, Mean Corpuscular Volume 90, Mean Corpuscular Hemoglobin 27.4, Mean Corpuscular

Hemoglobin Concent 30.5L, Red Cell Distribution Width 14.4, Platelet Count 207, 

Mean Platelet Volume 6.4L, Neutrophils (%) (Auto) 51.2, Lymphocytes (%) (Auto) 

29.0, Monocytes (%) (Auto) 8.1, Eosinophils (%) (Auto) 10.5H, Basophils (%) 

(Auto) 1.1, Sodium Level 138, Potassium Level 4.3, Chloride Level 106, Carbon 

Dioxide Level 26, Anion Gap 7, Blood Urea Nitrogen 35H, Creatinine 1.6H, Estimat

Glomerular Filtration Rate 32.0, Glucose Level 148H, Calcium Level 8.7


11/4/20 05:16: POC Whole Blood Glucose 174H


Height (Feet):  5


Height (Inches):  2.00


Weight (Pounds):  209


General Appearance:  alert


EENT:  normal ENT inspection


Neck:  normal alignment


Cardiovascular:  normal peripheral pulses, normal rate, regular rhythm


Respiratory/Chest:  chest wall non-tender, lungs clear, normal breath sounds


Abdomen:  normal bowel sounds, non tender, soft


Extremities:  normal inspection


Edema:  no edema noted Arm (L), no edema noted Arm (R), no edema noted Leg (L), 

no edema noted Leg (R), no edema noted Pedal (L), no edema noted Pedal (R), no 

edema noted Generalized


Neurologic:  motor weakness


Skin:  normal pigmentation, warm/dry





Assessment/Plan


Problem List:  


(1) Diabetes


ICD Codes:  E11.9 - Type 2 diabetes mellitus without complications


SNOMED:  40031458, 401128001, [id number]


Qualifiers:  


   Qualified Codes:  E11.9 - Type 2 diabetes mellitus without complications; 

Z79.4 - Long term (current) use of insulin


(2) Renal insufficiency


ICD Codes:  N28.9 - Disorder of kidney and ureter, unspecified


SNOMED:  449438490, 458512446


(3) Fall


ICD Codes:  W19.XXXA - Unspecified fall, initial encounter


SNOMED:  6661468, 803609785, [id number]


Qualifiers:  


   Qualified Codes:  W19.XXXA - Unspecified fall, initial encounter


(4) Unable to ambulate


ICD Codes:  R26.2 - Difficulty in walking, not elsewhere classified


SNOMED:  238809290, 171142419, [id number]


(5) Sprain of right lower leg


ICD Codes:  S83.91XA - Sprain of unspecified site of right knee, initial 

encounter


SNOMED:  832120733, 719568278, [id number]


Qualifiers:  


   Qualified Codes:  S83.91XA - Sprain of unspecified site of right knee, 

initial encounter


Status:  unchanged


Assessment/Plan:


pt diet pain control abx cbc bmp am aru eval











Haque,Dominguez Chi-Mirella DO         Nov 4, 2020 09:25

## 2020-11-04 NOTE — INFECTIOUS DISEASES PROG NOTE
Assessment/Plan








Assessment/Plan:


69 yo female with PHMx of DM and Breast CA ( Status post resection) who 

presented to the ED after a fall. 





Pyuria


    UA  Increased LE and Mod Ryan seen; ucx MIXED UROGENITAL CONTAMINANTS


                                    COLONY COUNT: >100,000 CFU/ML


    Asymptomatic





sp Fall 


R foot pain


   -MRI R foot:  No fracture or osteomyelitis. Mild subcutaneous soft tissue 

edema predominantly in the dorsum of mid  foot and forefoot without fluid 

collection.


 


Low grade fever x 1


No Leukocytosis


  -11/2 CXR: Cardiomegaly. No acute process


 


DM


Breast CA ( Status post resection)


Right Ankle Fx  2019





PLAN


- Monitor off abx unless persistent febrile or high fever, leukocytosis and/or 

HD instability


- f/u Cultures


- Monitor CBC and Temps


-ok to dc off antibiotics





Thank you for this consult. [facility] will continue to follow Ms. Morales while 

she hiospitalized.





Subjective


Allergies:  


Coded Allergies:  


     HYDROCODONE (Verified  Allergy, Unknown, 10/31/20)


     LORATADINE (Verified  Allergy, Unknown, 10/31/20)


   pt states not feeling well and having rashes after taking


   this medication at a rehab facility


Tm 100.3; afebrile in ~24hrs


no leukocytosis 


at 2l NC


Cr improving





Objective





Last 24 Hour Vital Signs








  Date Time  Temp Pulse Resp B/P (MAP) Pulse Ox O2 Delivery O2 Flow Rate FiO2


 


11/4/20 09:00      Room Air  


 


11/4/20 08:15     95 Nasal Cannula 2.0 28


 


11/4/20 08:00 98.0 76 16 132/68 (89) 98   


 


11/4/20 07:27 98.0       


 


11/4/20 04:01 98.0 81 16 125/69 (87) 96   


 


11/4/20 00:00 97.7 82 16 128/65 (86) 97   


 


11/3/20 21:00      Room Air  


 


11/3/20 20:00 97.9 88 18 137/63 (87) 97   


 


11/3/20 16:55 97.7       


 


11/3/20 16:00 98.4 94 20 145/73 (97) 96   


 


11/3/20 13:53 97.7       


 


11/3/20 12:00 97.7 82 20 109/66 (80) 94   








Height (Feet):  5


Height (Inches):  2.00


Weight (Pounds):  209


GEN: NAD


HEENT: NCAT, MMM, EOMI, No Scleral icterus, 


Neck: No LAD, Supple


LUNGS: CTAB, No W


HEART: RRR, S1, S2. 


ABD: Soft, NT, Mild Distension


Skin: No rash,, Normal in color


Ext. Some pain with on ambulation right LE


NEURO: A/O x 4, No focal deficits





Microbiology








 Date/Time


Source Procedure


Growth Status





 


 11/1/20 17:45


Nasopharynx SARS-CoV-2 RdRp Gene Assay - Final Complete











Laboratory Tests








Test


 11/3/20


12:06 11/3/20


16:23 11/3/20


20:49 11/4/20


05:11


 


POC Whole Blood Glucose


 Pending  


 Pending  


 182 MG/DL


()  H 





 


White Blood Count


 


 


 


 5.0 K/UL


(4.8-10.8)


 


Red Blood Count


 


 


 


 3.85 M/UL


(4.20-5.40)  L


 


Hemoglobin


 


 


 


 10.6 G/DL


(12.0-16.0)  L


 


Hematocrit


 


 


 


 34.6 %


(37.0-47.0)  L


 


Mean Corpuscular Volume    90 FL (80-99)  


 


Mean Corpuscular Hemoglobin


 


 


 


 27.4 PG


(27.0-31.0)


 


Mean Corpuscular Hemoglobin


Concent 


 


 


 30.5 G/DL


(32.0-36.0)  L


 


Red Cell Distribution Width


 


 


 


 14.4 %


(11.6-14.8)


 


Platelet Count


 


 


 


 207 K/UL


(150-450)


 


Mean Platelet Volume


 


 


 


 6.4 FL


(6.5-10.1)  L


 


Neutrophils (%) (Auto)


 


 


 


 51.2 %


(45.0-75.0)


 


Lymphocytes (%) (Auto)


 


 


 


 29.0 %


(20.0-45.0)


 


Monocytes (%) (Auto)


 


 


 


 8.1 %


(1.0-10.0)


 


Eosinophils (%) (Auto)


 


 


 


 10.5 %


(0.0-3.0)  H


 


Basophils (%) (Auto)


 


 


 


 1.1 %


(0.0-2.0)


 


Sodium Level


 


 


 


 138 MMOL/L


(136-145)


 


Potassium Level


 


 


 


 4.3 MMOL/L


(3.5-5.1)


 


Chloride Level


 


 


 


 106 MMOL/L


()


 


Carbon Dioxide Level


 


 


 


 26 MMOL/L


(21-32)


 


Anion Gap


 


 


 


 7 mmol/L


(5-15)


 


Blood Urea Nitrogen


 


 


 


 35 mg/dL


(7-18)  H


 


Creatinine


 


 


 


 1.6 MG/DL


(0.55-1.30)  H


 


Estimat Glomerular Filtration


Rate 


 


 


 32.0 mL/min


(>60)


 


Glucose Level


 


 


 


 148 MG/DL


()  H


 


Calcium Level


 


 


 


 8.7 MG/DL


(8.5-10.1)


 


Test


 11/4/20


05:16 


 


 





 


POC Whole Blood Glucose


 174 MG/DL


()  H 


 


 














Current Medications








 Medications


  (Trade)  Dose


 Ordered  Sig/Lia


 Route


 PRN Reason  Start Time


 Stop Time Status Last Admin


Dose Admin


 


 Acetaminophen


  (Tylenol)  650 mg  Q6H  PRN


 ORAL


 TEMP >100.4  11/1/20 17:45


 12/1/20 17:44   





 


 Atorvastatin


 Calcium


  (Lipitor)  20 mg  BEDTIME


 ORAL


   10/31/20 21:00


 1/29/21 20:59  11/3/20 20:56





 


 Bisacodyl


  (Dulcolax)  10 mg  BIDPRN  PRN


 RECTAL


 Constipation  11/4/20 10:15


 2/2/21 05:44   





 


 Dextrose


  (Dextrose 50%)  25 ml  Q30M  PRN


 IV


 Hypoglycemia  10/31/20 11:45


 1/29/21 11:44   





 


 Dextrose


  (Dextrose 50%)  50 ml  Q30M  PRN


 IV


 Hypoglycemia  10/31/20 11:45


 1/29/21 11:44   





 


 Diphenhydramine


 HCl


  (Benadryl)  25 mg  Q6H  PRN


 ORAL


 Itching  11/2/20 18:30


 12/2/20 18:29  11/4/20 05:01





 


 Docusate Sodium


  (Colace)  100 mg  TWICE A  DAY


 ORAL


   11/4/20 09:00


 12/4/20 08:59  11/4/20 08:54





 


 Folic Acid


  (Folate)  1 mg  DAILY


 ORAL


   11/1/20 09:00


 12/1/20 08:59  11/4/20 08:55





 


 Gadobutrol


  (Gadavist)  7.5 mmol  NOW  PRN


 IV


 Radiology Procedure  11/3/20 14:30


 11/7/20 14:29   





 


 Heparin Sodium


  (Porcine)


  (Heparin 5000


 units/ml)  5,000 units  EVERY 12  HOURS


 SUBQ


   10/31/20 21:00


 12/15/20 20:59  11/4/20 09:00





 


 Ibuprofen


  (Motrin)  600 mg  Q8H  PRN


 ORAL


 For Pain  10/31/20 17:45


 11/30/20 17:44  11/4/20 06:57





 


 Insulin Aspart


  (NovoLOG)    BEFORE MEALS AND  HS


 SUBQ


   10/31/20 16:30


 1/29/21 16:29  11/4/20 06:06





 


 Insulin Detemir


  (Levemir)  38 units  BEDTIME


 SUBQ


   10/31/20 21:00


 1/29/21 20:59   





 


 Lidocaine


  (Lidoderm 5%


 PATCH)  1 patch  DAILY


 TDERMAL


   11/1/20 09:00


 1/30/21 08:59  11/4/20 08:59





 


 Magnesium


 Hydroxide


  (Mom)  30 ml  TIDPRN  PRN


 ORAL


 Constipation  11/4/20 10:00


 12/4/20 09:59  11/4/20 10:22





 


 Menthol/Methyl


 Salicylate


  (Bengay)  1 applic  FOUR TIMES A  DAY


 TOPIC


   11/1/20 10:00


 12/1/20 09:59  11/4/20 08:59





 


 Ondansetron HCl


  (Zofran)  4 mg  Q4H  PRN


 IVP


 Nausea & Vomiting  10/31/20 23:00


 11/30/20 22:59  11/3/20 07:04





 


 Oxycodone/


 Acetaminophen


  (Percocet 5-325)  1 tab  Q6H  PRN


 ORAL


 pain  10/31/20 23:00


 11/7/20 22:59  11/3/20 20:56





 


 Pioglitazone HCl


  (Actos)  45 mg  DAILY


 ORAL


   11/1/20 09:00


 12/1/20 08:59  11/4/20 08:55





 


 Sodium Phosphate


  (Fleet's Sodium


 Phosl Enema)  133 ml  BIDPRN  PRN


 RECTAL


 Constipation  11/4/20 10:15


 12/4/20 05:44   





 


 Vitamin D


  (Vitamin D)  1,000 intlu  DAILY


 ORAL


   11/1/20 09:00


 12/1/20 08:59  11/4/20 08:55




















Radha Colin M.D.             Nov 4, 2020 11:31

## 2020-11-04 NOTE — CONSULTATION
DATE OF CONSULTATION:  11/03/2020

NEUROLOGICAL CONSULTATION



CONSULTING PHYSICIAN:  Brandon Limon MD



CHIEF COMPLAINT:  This is the first [facility] admission for this

70-year-old right-handed white woman with diabetes mellitus type 2 for

last 18 years, hypertension, hyperlipidemia, and previous right

tibial-fibular fracture.  She was admitted with a fall.  I was asked to

see the patient because of right leg weakness, which she initially denied.

 



HISTORY OF PRESENT INJURY:   Around 3 a.m. she got up to

go to the bathroom, she sat down on the toilet and urinated on herself and

onto the floor on the left side.  The patient tried to stand up, slipped

and fell onto her buttocks.  She did not hit her head and there was no

loss of consciousness.  She telephoned the  and the paramedics

brought her to the hospital.  The chemistries revealed a low sodium of 133

and low calcium of 7.9, elevated BUN around 30, and creatinine of 1.5.

Glucose was 144.  Her CBC was initially normal with some elevated

neutrophil count, but normal white cell count.  Platelets were normal.

However, hemoglobin did fall from 11 down to 10.3.  The patient's PT and

PTT were normal.  Urinalysis revealed moderate bacteria, but only 2 to 4

white blood cells, she had +3 protein in urine, +1 ketones, and +1 blood.

Additional studies on 10/31/2020, x-rays of the right ankle revealed

osteopenia and no acute fracture.  The tibiofibular x-ray on 10/31/2020

revealed generalized osteopenia, which was severe.  There is no definite

radiographic evidence of acute fracture or dislocation.  There is no

mention of chronic fracture.  Chest x-ray reveals no acute disease.  

her UTI and he would follow up on her cultures.



On 11/01/2020,   Culture of urine revealed mixed

urogenital contaminants with a colonic count greater than 100,000.



The patient denies any migraine headaches, memory loss, seizures,

blackouts, loss of smell or taste, diplopia, blurred vision, hearing loss,

tinnitus, or vertigo.  She denies any tremors or shakes.  Before the

accident, she was walking.  There is no history of incontinence or muscle

weakness or ataxia.  On 07/19/2019, she fell in the lobby of her

apartment.  She is unsure why she fell, she was fatigued at that time.

She went to [facility], had a right tibia fracture and was treated. The patient

complains of pain in her right leg, especially when she moves up.  There

is no family history of neurologic disease.



PAST MEDICAL HISTORY/PAST MEDICAL ILLNESSES:

1. AODM type 2, see above.  She denies retinopathy but has probably some

renal insufficiency.

2. Left breast cancer in 2001 with lumpectomy, chemotherapy, and

radiation.

3. Hyperlipidemia.

4. Right ankle fracture, see above.

5. Fibroid tumors in the uterus, hysterectomy, and oophorectomy.



ALLERGIES:  She is allergic to Norco and loratadine.



MEDICATIONS:  She is on Crestor, Actos, and folic acid.  She is also on

vitamin D as well.



SURGERIES:  See above.  She had a hysterectomy in 1997.  She had a natural

pregnancy.



SOCIAL HISTORY:  She has been  twice.  She is a teacher.  She does

not know the health of her child.



REVIEW OF SYSTEMS:  Her appetite is good.  Her weight is 210 pounds, 5 feet

2 inches tall.



PHYSICAL EXAMINATION:

GENERAL:  She is a well-developed obese woman, in no acute

distress.

VITAL SIGNS:  Blood pressure is 145/73, pulse is 94 and regular,

respirations rate is 18, temperature is 97.7 degrees.

HEENT:  Examination of head reveals poor dentition.

NECK:  There is no posterior cervical tenderness or muscle spasm.  No

limitation of motion.  Carotids are +2 without any bruits.

LUNGS:  Clear to auscultation.

CARDIOVASCULAR SYSTEM:  PMI was not felt.  JVP appeared to be normal.

There is normal S1, S2 physiologically split.  There is no S3, S4,

murmurs, or rubs.

ABDOMEN:  The abdomen is very obese.  Bowel sounds are intact.

Organomegaly or masses could not be appreciated.

EXTREMITIES:  She has some slight tenderness from the right knee.  When she

moves her legs, she has pain in the right leg, but cannot tell me exactly

where it is.



NEUROLOGIC EXAMINATION:

MENTAL STATUS:  Her judgment was basically intact.  She would go to the

exit if she smells smoke in the theater.  Affect, the affect is

appropriate.  Memory, birthday is 1950.  Immediate recall is 3/3

objects.  Recent recall is 3/3 objects in 5 minutes.  Intellect,

similarities were abstract.  Train and bicycle are "vehicles for

transportation."  Orientation - time, she new it is November 2020, did not

know the exact day, she knew it is Tuesday.  Place, she knew she was at

[facility] on the third floor.  She was oriented to person.



CRANIAL NERVE EXAMINATION:

CRANIAL II:  Visual fields are intact to confrontation.

CRANIAL NERVES III, IV, AND VI:  Extraocular motility was full.  Pupils

were equal, round, reactive to light at 4.5 mm.

CRANIAL NERVE V:  Facial and corneal sensations were intact to fine touch.

Pterygoid strength is 5/5.

CRANIAL NERVES VII:  Facial strength is 5/5 bilaterally.

CRANIAL NERVES VIII:  Auditory acuity is probably normal, right ear, to

loud whisper.

CRANIAL NERVES IX AND X:  Not examined because of the pandemic.

CRANIAL NERVE XI:  Sternocleidomastoid strength is 5/5.

CRANIAL NERVE XII:  Tongue protrudes in the midline without fasciculations

or atrophy.

MUSCLE EXAMINATION:  Muscle bulk is difficult to evaluate.  Tone is normal.

Strength is 5/5 in the upper extremities without pronator drift.  It is

also 5/5 in the left lower extremity.  Right lower extremity, iliopsoas is

2/5 to 3/5.  The hamstring is 4/5.  Extensor on the right leg is 3/5 to

4/5.  Distal muscle strength is 5/5.  Abductors and adductors are about 3+

to 4/5.  Reflexes are +2 in the upper extremities, +2 at the knees with

crossed adductor on the right side, +1 to 2 at the ankles with either

withdrawal or upgoing toes bilaterally, especially on the left on testing

for Babinski response.

COORDINATION:  Finger-to-nose and rapid alternating movements were intact.

Heel-to-shin using the left leg and right knee was intact.  She could not

do it using the right leg and the left knee.

GAIT AND STATION:  Not tested.

SENSORY EXAMINATION:  Pinprick and fine touch were normal.  Proprioception

is intact.



IMPRESSION:  It is hard to tell whether or not the weakness in the right

leg is due to pain or actual muscle weakness.  Distally, her strength is

strong 5/5.  There is also a question of bilateral Babinski signs and

reflexes slightly increased.  Therefore, although it is hard to

localize this patient's lesion in the territory of the left anterior

cerebral artery and medial part of her left frontal lobe.  She really has

bilateral Babinski signs.  She had cervical spine and thoracic spine

problem.  Today she had an MRI scan of her right leg.  The results are not

available.



 She has a lot of osteopenia and there is no back pain, which would

make me think that there is no cord compression from a thoracic or

cervical spine fracture, and it is conceivable we may not find much in the

MRIs.  MRI of the lumbar spine should also probably be done,doubt right upper 
lumbar plexus.



PLAN:

1. MRI of the cervical and thoracic spine.

2. Speaking about this case.

3. Treat her pain.

4. Try to get records from [facility].

.









  ______________________________________________

  Brandon Limon MD





DR:  Amy

D:  11/03/2020 17:23

T:  11/03/2020 23:28

JOB#:  [id number]

CC:



MARTINE

## 2020-11-04 NOTE — NUR
NURSE NOTES:



Diabetic cranberry juice was given. Pt has BM and said "I feel better." Will continue to 
monitor.

## 2020-11-04 NOTE — NUR
NURSE NOTES:



Desean,  spoke to the patient. HH was set up and DME will be delivered to the 
hospital soon. Pt verbalized understanding.

## 2020-11-04 NOTE — NUR
NURSE NOTES:



Discharge instruction was given to pt. Pt verbalized understanding. Checked belongings list 
together and get sign. Pt got dressed up and waiting her caregiver.

## 2020-11-04 NOTE — NUR
NURSE NOTES:



Pt didn't have BM for 5 days. Dr. Haque was notified. MD ordered MOM 30ml PO tid prn.

## 2020-11-04 NOTE — CARDIOLOGY REPORT
--------------- APPROVED REPORT --------------





EKG Measurement

Heart [id number]

TN 180P52

[id number]

[id number]

[id number]



<Conclusion>

Normal sinus rhythm

Incomplete right bundle branch block

Left anterior fascicular block

Voltage criteria for left ventricular hypertrophy

Abnormal ECG

## 2020-11-04 NOTE — NUR
DISCHARGE PLANNING



ORDER FOR FWW AND BEDSIDE COMMODE FAXED TO 



[facility] [place]

P [phone]

## 2020-11-04 NOTE — NUR
NURSE HAND-OFF: 



Important Events on Shift:pain management, refused scheduled MRI thoracic spine since last 
night, new orders for stool softeners

Patient Status: stable

Diet: ccho (m)



Pending Orders: MRI thoracic spine 11/4/20

Pending Results/Labs:none

Pending MD notification:none



Latest Vital Signs: Temperature 98.0 , Pulse 81 , B/P 125 /69 , Respiratory Rate 16 , O2 SAT 
96 , Room Air, O2 Flow Rate 2.0 .  

Vital Sign Comment: none



Latest Krishnamurthy Fall Score: 95  

Fall Risk: High Risk 

Safety Measures: Call light Within Reach, Bed Alarm Zone 1, Side Rails Side Rails x3, Bed 
position Low and Locked.

Fall Precautions: 

Yellow Socks

Door Sign

Patient Fall Education



Report given to .

-------------------------------------------------------------------------------

Addendum: 11/04/20 at 0731 by Luma De RN

-------------------------------------------------------------------------------

Report given to DRE Foss.

## 2020-11-04 NOTE — NUR
DISCHARGE PLANNING



CALL MADE TO [facility] [phone]. S/W  DUNCAN IN RE TO [facility]. [facility] CONFIRMED HH 
COVERED BENEFITS AND PROVIDED THE FOLLOWING CONTRACTED HH AGENCIES.



[facility]

P [phone]

 [phone]



[facility]

[phone]



[facility]

[phone]





PATIENT HAS BEEN REFERRED TO 

[facility]

## 2020-11-05 NOTE — NUR
***INSURANCE***



FAXED TO [facility] 



 [phone]

 [phone]



 [phone]

 [phone]


-------------------------------------------------------------------------------

Addendum: 11/05/20 at 1652 by Promise Shaver CM

-------------------------------------------------------------------------------

DC SUMMARY/INSTRUCTIONS/ 10/4 CLINICALS FAXED

## 2020-11-05 NOTE — DISCHARGE SUMMARY
Discharge Summary


Discharge Summary


_


DATE OF ADMISSION: 10/31/2020





DATE OF DISCHARGE: 11/4/2020








DISCHARGED BY: Dr Haque





REASON FOR ADMISSION: 


70 years old female with past medical history of diabetes mellitus,   breast 

cancer, s/p  lumpectomy, was trying to urinate at home in the bathroom,  slipped

and then twisted her  right ankle and  was unable to walk because of pain.


Patient reported history of a fractured  ankle  in 2019 , which was healed 

completely .


She denied hitting her head and loss of consciousness .


No hip or back pain. 


Later in the emergency department patient reported pain 9 out of 10 , aching ,  

unable to ambulate or bear weight.


Upon evaluation she was slightly tachycardic,  blood pressure was 157/83, no 

fevers


Laboratory work-up revealed no leukocytosis.


Stable electrolytes.  


BUN 30, creatinine 1.4.  


Troponin negative, pro BNP 99, EKG revealed incomplete right bundle branch block

with left anterior fascicular block and left ventricular hypertrophy.


X-ray of the right tibia-fibula reveal no radiographic evidence of acute 

fracture or dislocation.  Severe generalized osteopenia.


X-ray of the right ankle revealed no acute fracture or dislocation.  Significant

generalized osteopenia.   


Patient received IV hydration and admitted for further management. 











CONSULTANTS: 


orthopedic surgeon Dr. Rivera 


neurologist Dr. Limon


ID specialist Dr. Colin


pain specialist Dr. Peña


hematologist/oncologist Dr. Ware 


 


 


HOSPITAL COURSE: 


Patient admitted to medical surgical floor..


Pain management was addressed as per pain specialist recommendation.


Orthopedic surgeon seen and evaluated patient .


MRI of the right foot revealed no fracture or osteomyelitis.


Mild subcutaneous soft tissue edema,  predominantly in the dorsal midfoot and 

forefoot without fluid collection.


Orthopedic surgeon recommended conservative treatment .


Patient started   physical therapy with weightbearing as tolerated. 


Fall precaution maintained. weightbearing provided as tolerated 


Surgeon also recommended cryotherapy and elevation  of n the right  lower  

extremity to decrease the swelling and pain..





Urine culture revealed mixed urogenital contaminant.  


ID specialist recommended to monitor patient off antibiotic , unless patient 

became persistently febrile or high fever , leukocytosis or other hemodynamic 

instability.


Hemoglobin  and hematocrit were closely monitored with goal to keep hemoglobin 

above 7, remained at baseline .


Anemia work-up was consistent with anemia of chronic disease. 


Prior to discharge hemoglobin 10.6 , hematocrit 34.6.  





Patient clinically stabilized and was ready for discharge home with home health 

services.  








FINAL DIAGNOSES: 


Right ankle sprain


Status post fall


History of right ankle fracture , 2019


Diabetes mellitus


Breast cancer , status post lumpectomy


Anemia of chronic disease


 


DISCHARGE MEDICATIONS:


See Medication Reconciliation list.





DISCHARGE INSTRUCTIONS:


Patient was discharged home with home health services.  


Follow up with primary care provider in one week.














I have been assigned to dictate discharge summary for this account. 


I was not involved in the patient's management.











Renetta Gamble NP                 Nov 5, 2020 16:05

## 2021-11-17 NOTE — NUR
NURSE NOTES:



Patient refused thoracic spine MRI. Informed to Dr. Haque. November 17, 2021       Charlie Hamm MD  [address] [facility] [address]  Via In Basket      Patient: Eliza Madrid   YOB: 1950   Date of Visit: 11/17/2021       Dear Dr. Hamm:    Thank you for referring Eliza Madrid to me for evaluation. Below are my notes for this visit with her.    If you have questions, please do not hesitate to call me. I look forward to following your patient along with you.      Sincerely,      LAURI Limon  [facility]        CC: No Recipients  CYNDY Limon  11/17/2021 11:06 AM  Signed         SLEEP EVALUATION NOTE     Eliza Madrid is a 70 year old female presenting for evaluation of: Follow-up, Sleep Problem, and Office Visit   .    Referring provider: Charlie Hamm MD  PCP: Charlie Hamm MD       HPI     Established patient reporting to sleep center for sleep study results.    PRIOR SLEEP STUDY DATE: 8/23/21  RESULTS: 1. AHI: 7.0                   2. DESATURATION GABRIELE: 73%                 CURRENT CO-MORBIDITIES:    [x] HTN     [] AF/ARRHYTHMIA    [] HYPOTHYROIDISM   []OTHER:  [] CAD     [x] OBESITY                  [] INSOMNIA  [] CHF     [] DM                         [] STROKE/TIA          [place] Sleepiness Scale:     0 = would never doze  1 = slight chance of dozing  2 = moderate chance of dozing  3 = high chance of dozing    Situation     Chance of Dozing       1. Sitting and reading   0    2. Watching TV    1  3. Sitting, inactive in a public place       (e.g. a theatre or a meeting)  0    4. As a passenger in a car for an hour       without a break    0    5. Lying down to rest in the afternoon      when circumstances permit  1  6. Sitting and talking to someone  0    7. Sitting quietly after lunch without      Alcohol     1  8. In a car, while stopped for a few      Minutes in traffic    0      TOTAL 3      Past Medical History:     Past Medical History:   Diagnosis Date   • Essential (primary) hypertension       Past Surgical History:   Procedure Laterality Date   • Hysterectomy       Family History   Problem Relation Age of Onset   • Hypertension Mother    • Diabetes Mother    • Hypertension Father      Social History     Tobacco Use   • Smoking status: Never Smoker   • Smokeless tobacco: Never Used   Vaping Use   • Vaping Use: never used   Substance Use Topics   • Alcohol use: Not Currently   • Drug use: No       ALLERGIES:  No Known Allergies  Current Outpatient Medications   Medication Sig   • chlorthalidone (THALITONE) 25 MG tablet Take 1 tablet by mouth daily.   • NIFEdipine XL (PROCARDIA XL) 90 MG 24 hr tablet TAKE 1 TABLET BY MOUTH EVERY DAY   • Klor-Con M 20 MEQ CR tablet TAKE 1 TABLET BY MOUTH EVERY DAY   • omeprazole (PriLOSEC) 40 MG capsule TAKE 1 CAPSULE BY MOUTH EVERY DAY   • atorvastatin (LIPITOR) 20 MG tablet Take 1 tablet by mouth daily.   • lisinopril (ZESTRIL) 40 MG tablet Take 1 tablet by mouth daily.   • metoPROLOL succinate (TOPROL-XL) 100 MG 24 hr tablet Take 1 tablet by mouth daily.   • gabapentin (NEURONTIN) 300 MG capsule Take 1 capsule by mouth at bedtime. TAKE 1 CAPSULE AT BEDTIME   • diclofenac (VOLTAREN) 1 % gel Apply 4 g topically 2 times daily as needed (pain).   • tolterodine (DETROL LA) 4 MG 24 hr capsule Take 4 mg by mouth daily.   • triamcinolone (ARISTOCORT) 0.1 % cream APPLY TO AFFECTED AREA TWICE A DAY   • hydrOXYzine (ATARAX) 25 MG tablet Take 1 tablet by mouth every 8 hours as needed for Itching.     No current facility-administered medications for this visit.        Review of Systems:     Review of Systems   Constitutional: Negative for chills, fever and weight loss.   HENT: Negative for congestion, ear discharge and sore throat.    Eyes: Negative for blurred vision and photophobia.   Cardiovascular: Negative for chest pain and palpitations.   Respiratory: Positive for sleep disturbances due to breathing and snoring.    Skin: Negative for rash.   Musculoskeletal: Negative for  back pain, joint swelling, muscle weakness and neck pain.   Gastrointestinal: Negative for constipation, diarrhea, nausea and vomiting.   Genitourinary: Positive for nocturia.   Neurological: Positive for excessive daytime sleepiness and headaches. Negative for numbness and paresthesias.     Physical Examination:     Vitals:    11/17/21 1048   BP: (!) 144/80   Pulse: 69   SpO2: 97%   Weight: 92.7 kg (204 lb 5.9 oz)   Height: 5' 6\" (1.676 m)     Body mass index is 32.99 kg/m².      Neck Circumference: 16 in  Nasal Passages: [x]Clear   [] Congested  Palate: Keller [] I  [] II   [] III   [x] IV              [place]/edema []none [x]+1  []+2  []+3  []+4  Neck:  No enlarged lymph nodes  Gen: No acute distress. Pleasant and interactive.  Head:  Normocephalic and atraumatic.  Eyes: Conjunctiva and sclera normal.   Heart:  Normal rate and regular rhythm, no murmur.  Lungs:  Normal respiratory rate and effort, breath sounds equal.  Extremities:  No edema in lower extremities.   Skin: Warm and dry, no rash.  Musculoskeletal:  No joint swelling or tenderness  Psych:  Affect was appropriate to situation and mood was normal.  Neuro:  Alert and oriented, attention and recall preserved, cranial nerves intact, motor strength preserved, coordination intact, sensation preserved, reflexes symmetric, gait normal.     Assessment and Plan:     PROBLEMS ADDRESSED THIS VISIT:  Problem List Items Addressed This Visit        Cardiac and Vasculature    Benign essential hypertension    Relevant Orders    SERVICE TO HOME CARE RESPIRATORY THERAPY       Endocrine and Metabolic    Obesity with body mass index (BMI) of 30.0 to 39.9    Relevant Orders    SERVICE TO HOME CARE RESPIRATORY THERAPY       Sleep    Snoring    Relevant Orders    SERVICE TO HOME CARE RESPIRATORY THERAPY    GUNNAR (obstructive sleep apnea) - Primary    Relevant Orders    SERVICE TO HOME CARE RESPIRATORY THERAPY      Other Visit Diagnoses     Hypersomnia, unspecified         Relevant Orders    SERVICE TO HOME CARE RESPIRATORY THERAPY           Comments: Reviewed sleep study in detail with patient and explained that we have a mild GUNNAR diagnosis.  Discussed the nature of sleep apnea and the cardiovascular risk factors of not treating it.   Explained the different treatment options such as CPAP and MAD  APAP [id number] to be ordered with mask of patient's choice  All questions and concerns were addressed and answered.  Charlie Hamm MD received this note via [employer].    Patient Counseling:     • We reviewed the nature of sleep apnea, the elevated cardiovascular risks and other health consequences associated with this condition and reviewed treatment options in detail.  • The patient was cautioned not to drive while sleepy.    CYNDY Limon

## 2023-01-28 NOTE — NUR
NURSE NOTES:

called Dr. Rivera to give up date of the patient. per MD to order MRI right foot. RN will 
input order and will carry on. 

- MD is aware that pt doesn't want to be placed in a SNF, patient stated that would like to 
go home and will hire a caregiver 24/7 until she feels strong to care for self. I will f/u 
as needed. - likely in setting of RSV   - management as above  - also former smoker, poss component of undiagnosed COPD. On Flovent on med review.   - continue with ATC nebs, c/w methylprednisolone for COPD exac  - weaned to RA  - resumed home Flovent as therapeutic interchange mometasone